# Patient Record
Sex: MALE | Race: WHITE | NOT HISPANIC OR LATINO | Employment: OTHER | URBAN - METROPOLITAN AREA
[De-identification: names, ages, dates, MRNs, and addresses within clinical notes are randomized per-mention and may not be internally consistent; named-entity substitution may affect disease eponyms.]

---

## 2017-07-21 ENCOUNTER — OFFICE VISIT (OUTPATIENT)
Dept: CARDIOLOGY | Facility: CLINIC | Age: 52
End: 2017-07-21
Payer: COMMERCIAL

## 2017-07-21 VITALS
HEART RATE: 70 BPM | SYSTOLIC BLOOD PRESSURE: 134 MMHG | DIASTOLIC BLOOD PRESSURE: 86 MMHG | BODY MASS INDEX: 29.09 KG/M2 | HEIGHT: 72 IN | WEIGHT: 214.75 LBS

## 2017-07-21 DIAGNOSIS — F41.9 ANXIETY: ICD-10-CM

## 2017-07-21 DIAGNOSIS — E78.00 HYPERCHOLESTEROLEMIA: Primary | ICD-10-CM

## 2017-07-21 DIAGNOSIS — Z82.49 FAMILY HISTORY OF PREMATURE CAD: ICD-10-CM

## 2017-07-21 DIAGNOSIS — R03.0 PRE-HYPERTENSION: ICD-10-CM

## 2017-07-21 DIAGNOSIS — I70.90 ATHEROSCLEROSIS OF ARTERIES: ICD-10-CM

## 2017-07-21 PROCEDURE — 99213 OFFICE O/P EST LOW 20 MIN: CPT | Mod: S$GLB,,, | Performed by: INTERNAL MEDICINE

## 2017-07-21 PROCEDURE — 99999 PR PBB SHADOW E&M-NEW PATIENT-LVL III: CPT | Mod: PBBFAC,,, | Performed by: INTERNAL MEDICINE

## 2017-07-21 RX ORDER — LORATADINE 10 MG/1
10 TABLET ORAL DAILY PRN
COMMUNITY
End: 2018-09-11 | Stop reason: SDUPTHER

## 2017-07-21 RX ORDER — PRAVASTATIN SODIUM 40 MG/1
40 TABLET ORAL DAILY
COMMUNITY
Start: 2017-06-25 | End: 2017-10-11 | Stop reason: SDUPTHER

## 2017-07-21 RX ORDER — ESCITALOPRAM OXALATE 5 MG/1
5 TABLET ORAL DAILY
Qty: 30 TABLET | Refills: 3 | Status: SHIPPED | OUTPATIENT
Start: 2017-07-21 | End: 2017-11-24 | Stop reason: SDUPTHER

## 2017-07-21 NOTE — PROGRESS NOTES
Subjective:    Patient ID:  Hakeem Avelar II is a 51 y.o. male who presents for Hyperlipidemia and Risk Factor Management For Atherosclerosis  ATHEROSCLEROSIS    HPI  PT WAS FOLLOWED AT [facility], NEW TO THIS CLINIC, STRESS WITH WORK, NO LABS, SEE ROS  Past Medical History:   Diagnosis Date    Aortic valve disorder     Cardiac murmur     Carotid artery occlusion     MINIMAL    Coronary artery disease     MINIMAL    Hyperlipidemia      Past Surgical History:   Procedure Laterality Date    TONSILLECTOMY       Family History   Problem Relation Age of Onset    Heart disease Mother     Heart disease Father      Social History     Social History    Marital status:      Spouse name: N/A    Number of children: N/A    Years of education: N/A     Social History Main Topics    Smoking status: Never Smoker    Smokeless tobacco: Never Used    Alcohol use No    Drug use: No    Sexual activity: Not Asked     Other Topics Concern    None     Social History Narrative    None       Review of patient's allergies indicates:  No Known Allergies    Current Outpatient Prescriptions:     aspirin-acetaminophen-caffeine 250-250-65 mg (EXCEDRIN MIGRAINE) 250-250-65 mg per tablet, Take 2 tablets by mouth once daily., Disp: , Rfl:     loratadine (CLARITIN) 10 mg tablet, Take 10 mg by mouth daily as needed for Allergies., Disp: , Rfl:     pravastatin (PRAVACHOL) 40 MG tablet, Take 40 mg by mouth once daily. , Disp: , Rfl:     escitalopram oxalate (LEXAPRO) 5 MG Tab, Take 1 tablet (5 mg total) by mouth once daily., Disp: 30 tablet, Rfl: 3    Review of Systems   Constitution: Negative for chills, diaphoresis, weakness, malaise/fatigue and night sweats.   HENT: Positive for congestion (ALLERGY). Negative for hearing loss and nosebleeds.         HAD ALLERGY TX 3 TIMES, MINIMAL IMPROVEMENT   Eyes: Negative for blurred vision, discharge, double vision and visual disturbance.   Cardiovascular: Negative for chest pain,  claudication, cyanosis, dyspnea on exertion (MINIMAL), irregular heartbeat, leg swelling, near-syncope, orthopnea, palpitations, paroxysmal nocturnal dyspnea and syncope.   Respiratory: Negative for cough, hemoptysis, shortness of breath, sleep disturbances due to breathing, sputum production and wheezing.         RECENT URI   Endocrine: Negative for cold intolerance, heat intolerance and polyuria.   Hematologic/Lymphatic: Negative for adenopathy and bleeding problem. Does not bruise/bleed easily.   Skin: Negative for color change, nail changes and rash.   Musculoskeletal: Negative for back pain (CHRONIC), falls, joint pain and stiffness.   Gastrointestinal: Negative for bloating, abdominal pain, change in bowel habit, dysphagia, heartburn, hematemesis, jaundice, melena and vomiting.   Genitourinary: Negative for dysuria, flank pain and frequency.   Neurological: Negative for brief paralysis, difficulty with concentration, disturbances in coordination, dizziness, focal weakness, light-headedness, loss of balance, numbness, paresthesias, seizures, sensory change and tremors.   Psychiatric/Behavioral: Negative for altered mental status, depression, memory loss and substance abuse. The patient has insomnia and is nervous/anxious (STRESS).    Allergic/Immunologic: Negative for environmental allergies and persistent infections.        Objective:      Vitals:    07/21/17 1038   BP: 134/86   Pulse: 70   Weight: 97.4 kg (214 lb 11.7 oz)   Height: 6' (1.829 m)   PainSc: 0-No pain     Body mass index is 29.12 kg/m².    Physical Exam   Constitutional: He is oriented to person, place, and time. He appears well-developed and well-nourished. He is active.   HENT:   Head: Normocephalic and atraumatic.   Mouth/Throat: Oropharynx is clear and moist and mucous membranes are normal.   Eyes: Conjunctivae and EOM are normal. Pupils are equal, round, and reactive to light.   Neck: Normal range of motion. Neck supple. Normal carotid  pulses, no hepatojugular reflux and no JVD present. Carotid bruit is not present. No tracheal deviation, no edema and no erythema present. No thyromegaly present.   Cardiovascular: Normal rate, regular rhythm and normal heart sounds.   No extrasystoles are present. PMI is not displaced.  Exam reveals no gallop, no distant heart sounds, no friction rub and no midsystolic click.    No murmur heard.  Pulses:       Carotid pulses are 2+ on the right side, and 2+ on the left side.       Radial pulses are 2+ on the right side, and 2+ on the left side.        Femoral pulses are 2+ on the right side, and 2+ on the left side.       Dorsalis pedis pulses are 2+ on the right side, and 2+ on the left side.        Posterior tibial pulses are 2+ on the right side, and 2+ on the left side.   Pulmonary/Chest: Effort normal and breath sounds normal. No accessory muscle usage. No tachypnea and no bradypnea. No respiratory distress.   Abdominal: Soft. Bowel sounds are normal. He exhibits no distension and no mass. There is no hepatosplenomegaly. There is no tenderness. There is no CVA tenderness.   Musculoskeletal: Normal range of motion. He exhibits no edema or deformity.   Lymphadenopathy:     He has no cervical adenopathy.   Neurological: He is alert and oriented to person, place, and time. He has normal strength. He displays no tremor. No cranial nerve deficit. He exhibits normal muscle tone. Coordination normal.   Skin: Skin is warm and dry. No cyanosis or erythema. No pallor.   Psychiatric: He has a normal mood and affect. His speech is normal and behavior is normal. Judgment and thought content normal.               ..    Chemistry    No results found for: NA, K, CL, CO2, BUN, CREATININE, GLU No results found for: CALCIUM, ALKPHOS, AST, ALT, BILITOT, ESTGFRAFRICA, EGFRNONAA         ..No results found for: CHOL  No results found for: HDL  No results found for: LDLCALC  No results found for: TRIG  No results found for:  CHOLHDL  ..No results found for: WBC, HGB, HCT, MCV, PLT    Test(s) Reviewed  I have reviewed the following in detail:  [] Stress test   [] Angiography   [] Echocardiogram   [] Labs   [x] Other:       Assessment:         ICD-10-CM ICD-9-CM   1. Hypercholesterolemia E78.00 272.0   2. Atherosclerosis of arteries I70.8 440.8   3. Pre-hypertension R03.0 796.2   4. Family history of premature CAD Z82.49 V17.3   5. Anxiety F41.9 300.00     Problem List Items Addressed This Visit        Neuro    Anxiety       Cardiac    Atherosclerosis of arteries    Relevant Orders    Comprehensive metabolic panel       Fluids/Electrolytes/Nutrition/GI    Hypercholesterolemia - Primary    Relevant Orders    Comprehensive metabolic panel    Lipid panel       Other    Pre-hypertension    Relevant Orders    Comprehensive metabolic panel    Family history of premature CAD      Other Visit Diagnoses    None.          Plan:     ALL CV CLINICALLY STABLE, NO ANGINA, NO HF, NO TIA, NO CLINICAL ARRHYTHMIA,CONTINUE CURRENT MEDS, EDUCATION, DIET, EXERCISE, OK FOR LOW DOSE LEXAPRO, RTC IN 3 MO WITH LABS, EXPLAINED PLAN TO PT/WIFE      Hypercholesterolemia  Comments:  CHECK  Orders:  -     Comprehensive metabolic panel; Future; Expected date: 07/21/2017  -     Lipid panel; Future; Expected date: 07/21/2017    Atherosclerosis of arteries  Comments:  STABLE  Orders:  -     Comprehensive metabolic panel; Future; Expected date: 07/21/2017    Pre-hypertension  Comments:  CATHY, CONSIDER RAMIPRIL  Orders:  -     Comprehensive metabolic panel; Future; Expected date: 07/21/2017    Family history of premature CAD    Anxiety  Comments:  OK FOR LEXAPRO    Other orders  -     escitalopram oxalate (LEXAPRO) 5 MG Tab; Take 1 tablet (5 mg total) by mouth once daily.  Dispense: 30 tablet; Refill: 3    RTC Low level/low impact aerobic exercise 5x's/wk. Heart healthy diet and risk factor modification.    See labs and med orders.    Aerobic exercise 5x's/wk. Heart  healthy diet and risk factor modification.    See labs and med orders.

## 2017-07-21 NOTE — PATIENT INSTRUCTIONS
Anxiety Reaction  Anxiety is the feeling we all get when we think something bad might happen. It is a normal response to stress and usually causes only a mild reaction. When anxiety becomes more severe, it can interfere with daily life. In some cases, you may not even be aware of what it is youre anxious about. There may also be a genetic link or it may be a learned behavior in the home.  Both psychological and physical triggers cause stress reaction. It's often a response to fear or emotional stress, real or imagined. This stress may come from home, family, work, or social relationships.  During an anxiety reaction, you may feel:  · Helpless  · Nervous  · Depressed  · Irritable  Your body may show signs of anxiety in many ways. You may experience:  · Dry mouth  · Shakiness  · Dizziness  · Weakness  · Trouble breathing  · Breathing fast (hyperventilating)  · Chest pressure  · Sweating  · Headache  · Nausea  · Diarrhea  · Tiredness  · Inability to sleep  · Sexual problems  Home care  · Try to locate the sources of stress in your life. They may not be obvious. These may include:  ¨ Daily hassles of life (traffic jams, missed appointments, car troubles, etc.)  ¨ Major life changes, both good (new baby, job promotion) and bad (loss of job, loss of loved one)  ¨ Overload: feeling that you have too many responsibilities and can't take care of all of them at once  ¨ Feeling helpless, feeling that your problems are beyond what youre able to solve  · Notice how your body reacts to stress. Learn to listen to your body signals. This will help you take action before the stress becomes severe.  · When you can, do something about the source of your stress. (Avoid hassles, limit the amount of change that happens in your life at one time and take a break when you feel overloaded).  · Unfortunately, many stressful situations can't be avoided. It is necessary to learn how to better manage stress. There are many proven methods  that will reduce your anxiety. These include simple things like exercise, good nutrition and adequate rest. Also, there are certain techniques that are helpful:  ¨ Relaxation  ¨ Breathing exercises  ¨ Visualization  ¨ Biofeedback  ¨ Meditation  For more information about this, consult your doctor or go to a local bookstore and review the many books and tapes available on this subject.  Follow-up care  If you feel that your anxiety is not responding to self-help measures, contact your doctor or make an appointment with a counselor. You may need short-term psychological counseling and temporary medicine to help you manage stress.  Call 911  Call your healthcare provider right away if any of these occur:  · Trouble breathing  · Confusion  · Drowsiness or trouble wakening  · Fainting or loss of consciousness  · Rapid heart rate  · Seizure  · New chest pain that becomes more severe, lasts longer, or spreads into your shoulder, arm, neck, jaw, or back  When to seek medical advice  Call your healthcare provider right away if any of these occur:  · Your symptoms get worse  · Severe headache not relieved by rest and mild pain reliever  Date Last Reviewed: 9/29/2015  © 5962-5434 [employer]. [address] [place] [address]. All rights reserved. This information is not intended as a substitute for professional medical care. Always follow your healthcare professional's instructions.        Taking Aspirin for Atherosclerosis       Aspirin is a medicine often prescribed to treat atherosclerosis. This condition affects your arteries. These are the blood vessels that carry blood away from your heart. Having atherosclerosis means youre at greater risk of a heart attack or stroke. Aspirin can help prevent these from happening.  How atherosclerosis affects your arteries   A fatty material (plaque) can build up in your arteries. This makes it harder for blood to flow through them. A blood clot can then form on  the plaque. This may block the artery, cutting off blood flow. This can cause conditions such as coronary artery disease (CAD) and peripheral arterial disease (PAD):  · CAD occurs when plaque builds up in the coronary artery. This artery supplies the heart with oxygen-rich blood.  · PAD occurs when plaque forms in leg arteries.  The same things that cause CAD and PAD can also cause plaque to form in other arteries in your body, such as those in the brain. When plaque occurs in any of these arteries, it raises your risk of heart attack or stroke.  What aspirin does  Aspirin is a blood-thinner (antiplatelet medicine). It helps keep blood clots from forming. This reduces the risk of blockage. Aspirin can be taken daily if you are at high risk of or have already had a heart attack or stroke. It is also used after a procedure called a stent placement. This is when a tiny wire mesh tube, or stent, is placed in an artery to keep it open. Aspirin helps prevent blood clots from forming on the stent.  Taking aspirin safely  Tell your healthcare provider about any medicines you are taking. This includes:  · All prescription medicines  · Over-the-counter medicines  · Herbs, vitamins, and other supplements  Also mention if you have a history of ulcers or bleeding problems. Ask whether you will need to stop taking aspirin before having surgery or dental work. Always take medicines as directed.  Tips for taking aspirin  · Have a routine. For example, take aspirin with the same meal each day.  · Dont take more than prescribed. A low dose gives the same benefit as a higher one, with a lower risk of side effects.  · Dont skip doses. Aspirin needs to be taken each day to work well.  · Keep track of what you take. A pillbox with days of the week can help, especially if you take several medicines. Or use a list or chart to keep track.  When to call your healthcare provider  Side effects of aspirin are not usually serious. If you do  have problems, changing your dose may help. Call your healthcare provider if you have any of the following:  · Excessive bruising (some bruising is normal)  · Nosebleeds, bleeding gums, or other excessive bleeding  · An upset stomach or stomach pain   Date Last Reviewed: 6/1/2016  © 1953-2752 [employer]. [address] [place] [address], [place] [address]. All rights reserved. This information is not intended as a substitute for professional medical care. Always follow your healthcare professional's instructions.        Heart Disease Education    The heart beats 60 to 100 times per minute, 24 hours a day. This equals almost 1000,000 times a day. It pumps blood with oxygen and nutrients to the tissues and organs of the body. But the heart is a muscle and needs its own supply of blood. Blood flow to the heart is supplied by the coronary arteries. Coronary artery disease (atherosclerosis) is a result of cholesterol, saturated fat, and calcium deposits (plaques) that build up inside the walls. This causes inflammation within the coronary arteries. These plaques narrow the artery and reduce blood flow to the heart muscle. The reduction in blood flow to the heart muscle decreases oxygen supply to the heart. If the narrowing is significant enough, the oxygen supply to one or more regions of the heart can be temporarily or permanently shut down. This can cause chest pain, and possibly death of heart tissue (heart attack).  Types of chest pain  Angina is the name for pain in the heart muscle. Angina is a warning sign of serious heart disease. When untreated it can lead to a heart attack, also known as acute myocardial infarction, or AMI. Angina occurs when there is not enough blood and oxygen flowing to the heart for the amount of work it is doing. This most often happens during physical exertion, when the heart is working hardest. It is usually relieved by rest or nitroglycerin. Angina may also occur after a large meal  when extra blood is sent to the digestive organs and less goes to the heart. In the case of advanced or unstable heart disease, angina can occur at rest or awaken you from sleep. Angina usually lasts from a few minutes up to 20 minutes or more. When treated early, the effects of angina can be reversed without permanent damage to the heart. Angina is a serious condition and needs to be evaluated by a medical professional immediately.  There are two types of angina -- stable and unstable:  · Stable angina usually occurs with a predictable level of activity. Being stable, its character, severity, and occurrence do not change much over time. It usually starts with activity, and resolves with rest or taking your medicine as instructed by your doctor. The symptoms usually do not last long.  · Unstable angina changes or gets worse over time. It is different from whatever you are used to. It may feel different or worse, begin without cause, occur with exercise or exertion, wake you up from sleep, and last longer. It may not respond in the same way as it does when you take your usual medicines for an attack. This type of angina can be a warning sign of an impending heart attack.     A heart attack is usually the result of a blood clot that suddenly forms in a coronary artery that has been narrowed with plaque. When this occurs, blood flow may be cut off to a part of the heart muscle, causing the cells to die. This weakens the pumping action of the heart, which affects the delivery of blood to all the other organs in the body including the brain. This damage is not reversible. However, early treatment can limit the amount of damage.  The pain you feel with angina and a heart attack may have a similar quality. However, it is usually different in intensity and duration. Here are some typical descriptions of a heart attack:  · It is most often experienced as a squeezing, crushing, pressure-like sensation in the center of the  "chest.  · It is sometimes described as something heavy sitting on my chest.  · It may feel more like a bad case of indigestion.  · The pain may spread from the chest to the arm, shoulder, throat or jaw.  · Sometimes the pain is not felt in the chest at all, but only in the arm, shoulder, throat or jaw.  · There may also be nausea, vomiting, dizziness or light-headedness, sweating and trouble breathing.  · Palpitations, or your heart beating rapidly  · A new, irregular heart beat  · Unexplained weakness  You may not be able to tell the difference between "bad" angina and a heart attack at home. Seek help if your symptoms are different than usual. Do not be in denial or just try to "tough it out."  Call 911  This is the fastest and safest way to get to the emergency department. The paramedics can also start treatment on the way to the hospital, saving valuable time for your heart.  · If the angina gets worse, if it continues, or if it stops and returns, call 911 immediately. Do not delay. You may be having a heart attack.  · After you call 911, take a second tablet or spray unless instructed otherwise. When repeating doses, sit down if possible, because it can make you feel lightheaded or dizzy. Wait another 5 minutes. If the angina still does not go away, take a third tablet or spray. Do not take more than 3 tablets or sprays within 15 minutes. Stay on the phone with 911 for further instruction.  · Your healthcare provider may give you slightly different instructions than those above. If so, follow them carefully.  Do not wait until symptoms become severe to call 911.  Other reasons to call 911 include:  · Trouble breathing  · Feeling lightheaded, faint, or dizzy  · Rapid heart beat  · Slower than usual heart rate compared to your normal  · Angina with weakness, dizziness, fainting, heavy sweating, nausea, or vomiting  · Extreme drowsiness, confusion  · Weakness of an arm or leg or one side of the " "face  · Difficulty with speech or vision  When to seek medical care  Remember, the signs and symptoms of a heart attack are not always like they are on TV. Sometimes they are not so obvious. You may only feel weak, or just not right. If it is not clear or if you have any doubt, call for advice.  · Seek help if there is a change in the type of pain, if it feels different, or if your symptoms are mild.  · Do not drive yourself. Have someone else drive you. If no one can drive, call 911.  · Do not delay. Fast diagnosis and treatment can prevent or limit the amount of heart damage during a heart attack.  · Do not go to your doctor's office or a clinic as they may not be able to provide all the testing and treatment required for this condition.  · If your doctor has given you medicine to take when symptoms occur, take them but don't delay getting help trying to locate medicines.  What happens in the emergency department  The emergency department is connected to your local emergency medical system (EMS) through 911. That's why during a cardiac emergency, calling 911 is the fastest way to get help. The goal of the emergency department is to rapidly screen, evaluate, and treat people.  Once you are there, an electrocardiogram (ECG or heart tracing) will be done. Blood samples may be taken to look for the presence of heart enzymes that leak from damaged heart cells and show if a heart attack is occurring. You will often be evaluated by a heart specialist (cardiologist) who decides the best course of action. In the case of severe angina or early heart attack, and depending on the circumstances, powerful "clot busting" medicines can be used to dissolve blood clots in the coronary artery. In other cases, you may be taken to a cardiac catheterization lab. Here, a tiny balloon-tipped catheter is advanced through blood vessels to the heart. There the balloon is inflated pushing open the blood vessel restoring blood flow.  Risk " factors for heart disease  Risk factors for heart disease are a combination of genetic and lifestyle. Many risk factors work by either directly or indirectly damaging the blood vessels of the heart, or by increasing the risk of forming blood or cholesterol clots, which then clog up and block the arteries.     Examples of physical lifestyle risk factors:  · Cigarette smoking  · High blood pressure  · High blood cholesterol  · Use of stimulant drugs such as cocaine, crack, and amphetamines  · Eating a high-fat, high-cholesterol meal  · Diabetes   · Obesity which increases risk for diabetes and high blood pressure  · Lack of regular physical activity     Examples of emotional lifestyle factors:  · Chronic high stress levels release stress hormones. These raise blood pressure and cholesterol level and makes blood clot more easily.  · Held-in anger, hostile or cynical attitude  · Social and emotional isolation, lack of intimacy  · Loss of relationship  · Depression  Other factors that increase the risk of heart attack that you cannot control :  · Age. The older you get beyond 40, the greater is your risk of significant coronary artery disease.  · Gender. More men than women get heart disease; but once past menopause, women who are not taking estrogen replacement have the same risk as men for a heart attack.  · Family history. If your mother, father, brother or sister has coronary artery disease, your risk of having it is higher than a person your age without this family history.  What can you do to decrease your risk  To reduce your risk of heart disease:  · Get regular checkups with your doctor.  · Take your medicines for blood pressure, cholesterol or diabetes as directed.  · Watch your diet. Eat a heart healthy diet choosing fresh foods, less salt, cholesterol, and fat  · Stop smoking. Get help if needed.  · Get regular exercise.  · Manage stress.  · Carry a list of medicines and doses in your wallet.  Date Last  Reviewed: 12/30/2015  © 8168-2648 [employer]. [address] [place] [address], [place] [address]. All rights reserved. This information is not intended as a substitute for professional medical care. Always follow your healthcare professional's instructions.        Established High Blood Pressure    High blood pressure (hypertension) is a chronic disease. Often health care providers dont know what causes it. But it can be caused by certain health conditions and medicines.  If you have high blood pressure, you may not have any symptoms. If you do have symptoms, they may include headache, dizziness, changes in your vision, chest pain, and shortness of breath. But even without symptoms, high blood pressure thats not treated raises your risk for heart attack and stroke. High blood pressure is a serious health risk and shouldnt be ignored.  A blood pressure reading is made up of two numbers: a higher number over a lower number. The top number is the systolic pressure. The bottom number is the diastolic pressure. A normal blood pressure is less than 120 over less than 80.  High blood pressure is when either the top number is 140 or higher, or the bottom number is 90 or higher. This must be the result when taking your blood pressure a number of times. The blood pressures between normal and high are called prehypertension.  Home care  If you have high blood pressure, you should do what is listed below to lower your blood pressure. If you are taking medicines for high blood pressure, these methods may reduce or end your need for medicines in the future.  · Begin a weight-loss program if you are overweight.  · Cut back on how much salt you get in your diet. Heres how to do this:  ¨ Dont eat foods that have a lot of salt. These include olives, pickles, smoked meats, and salted potato chips.  ¨ Dont add salt to your food at the table.  ¨ Use only small amounts of salt when cooking.  · Begin an exercise program. Talk  with your health care provider about the type of exercise program that would be best for you. It doesn't have to be hard. Even brisk walking for 20 minutes 3 times a week is a good form of exercise.  · Dont take medicines that have heart stimulants. This includes many cold and sinus decongestant pills and sprays, as well as diet pills. Check the warnings about hypertension on the label. Stimulants such as amphetamine or cocaine could be lethal for someone with high blood pressure. Never take these.  · Limit how much caffeine you get in your diet. Switch to caffeine-free products.  · Stop smoking. If you are a long-time smoker, this can be hard. Enroll in a stop-smoking program to make it more likely that you will quit for good.  · Learn how to handle stress. This is an important part of any program to lower blood pressure. Learn about relaxation methods like meditation, yoga, or biofeedback.  · If your provider prescribed medicines, take them exactly as directed. Missing doses may cause your blood pressure get out of control.  · Consider buying an automatic blood pressure machine. You can get one of these at most pharmacies. Use this to watch your blood pressure at home. Give the results to your provider.  Follow-up care  You will need to make regular visits to your health care provider. This is to check your blood pressure and to make changes to your medicines. Make a follow-up appointment as directed.  When to seek medical advice  Call your health care provider right away if any of these occur:  · Chest pain or shortness of breath  · Severe headache  · Throbbing or rushing sound in the ears  · Nosebleed  · Sudden severe pain in your belly (abdomen)  · Extreme drowsiness, confusion, or fainting  · Dizziness or dizziness with a spinning sensation (vertigo)  · Weakness of an arm or leg or one side of the face  · You have problems speaking or seeing   Date Last Reviewed: 11/25/2014  © 2385-8159 [employer] [address] [place] [address]. All rights reserved. This information is not intended as a substitute for professional medical care. Always follow your healthcare professional's instructions.

## 2017-10-11 RX ORDER — PRAVASTATIN SODIUM 40 MG/1
TABLET ORAL
Qty: 30 TABLET | Refills: 5 | Status: SHIPPED | OUTPATIENT
Start: 2017-10-11 | End: 2018-05-13 | Stop reason: SDUPTHER

## 2017-10-20 ENCOUNTER — OFFICE VISIT (OUTPATIENT)
Dept: CARDIOLOGY | Facility: CLINIC | Age: 52
End: 2017-10-20
Payer: COMMERCIAL

## 2017-10-20 VITALS
WEIGHT: 218.69 LBS | SYSTOLIC BLOOD PRESSURE: 125 MMHG | DIASTOLIC BLOOD PRESSURE: 82 MMHG | BODY MASS INDEX: 29.62 KG/M2 | HEART RATE: 54 BPM | HEIGHT: 72 IN

## 2017-10-20 DIAGNOSIS — R00.1 BRADYCARDIA: ICD-10-CM

## 2017-10-20 DIAGNOSIS — R03.0 PRE-HYPERTENSION: ICD-10-CM

## 2017-10-20 DIAGNOSIS — E78.00 HYPERCHOLESTEROLEMIA: Primary | ICD-10-CM

## 2017-10-20 PROCEDURE — 99213 OFFICE O/P EST LOW 20 MIN: CPT | Mod: S$GLB,,, | Performed by: INTERNAL MEDICINE

## 2017-10-20 PROCEDURE — 99999 PR PBB SHADOW E&M-EST. PATIENT-LVL III: CPT | Mod: PBBFAC,,, | Performed by: INTERNAL MEDICINE

## 2017-10-20 NOTE — PATIENT INSTRUCTIONS
Bradycardia    When your heart rate is slow, less than 60 beats per minute, it is called bradycardia. Bradycardia can be normal, caused by medicines, or a sign of a disease. The slow heart rate may not be constant; it can come and go. It is a concern when it is very low, or you have symptoms.  Signs and symptoms  The following are signs and symptoms of bradycardia:  · Heart rate less than 60 per minute  · Dizziness or feeling lightheaded  · Weakness  · Trouble breathing  · Fainting  · Sleepiness  · More trouble exercising than usual because of fatigue  · Confusion or trouble concentrating  Causes  There are many causes of bradycardia. Some can be related to your heart, but some may be related to other factors.  Non-heart-related causes:  · Advanced age  · Side effect of certain medicines (such as beta-blockers, calcium channel blockers, digitalis, antiarrhythmic medicines like amiodarone, clonidine, lithium)  · Medical conditions such as hypoglycemia (low blood sugar), hypothyroidism (low thyroid), electrolyte disorder,  hypothermia, sleep apnea  · Athletes, especially long-distance runners, may have a slow heart rate. This can be normal.  · Sleep apnea  · Brain injury such as stroke or bleeding inside the brain  Heart-related causes:  · Coronary artery disease (angina or prior heart attack, also known as acute myocardial infarction, or AMI)  · Heart valve disease  · Heart muscle disease (cardiomyopathy)  · Congestive heart failure  · Sick sinus syndrome, which is when your heart's natural pacemaker is no longer working properly  · Diseases that infiltrate the heart such as sarcoid  · Heart infections  Sometimes the cause for the arrhythmia cannot be found.  Bradycardia that causes symptoms is sometimes reversible, and can be treated with medicines. When more severe bradycardia persists, a pacemaker is generally recommended. When the bradycardia does not cause symptoms, your doctor may decide to evaluate it in his  or her office.  Home care  The following will help you care for yourself at home:  · Resume your usual activities when you are feeling back to normal.  · If you develop any of the symptoms below during exertion, then you should not exert yourself until evaluated further by your doctor.  · Work with your doctor on any needed lifestyle changes, such as changing your diet, stopping smoking if you are a smoker, and a planned exercise program.  Follow-up care  Follow up with your doctor, or as advised.  Call 911  Call 911 if any of the following occur:  · Chest pain  · Trouble breathing  · Slow heart rate with dizziness or lightheadedness  · Fainting or loss of consciousness  · Chest, shoulder, arm, neck, or back pain  · Slow heart rate (under 50 beats per minute) if associated with symptoms  When to seek medical advice  Get prompt medical attention if any of the following occur:  · Occasional weakness, dizziness, or lightheadedness  Date Last Reviewed: 4/25/2016  © 9859-4492 [employer]. [address] [place] [address]. All rights reserved. This information is not intended as a substitute for professional medical care. Always follow your healthcare professional's instructions.        High Cholesterol: Assessing Your Risk    Have you been told that your cholesterol is too high? If so, you could be heading for a heart attack, also known as acute myocardial infarction (AMI), or stroke. This is especially true if you have other risk factors for heart disease. Get smart about cholesterol and your heart disease risk. This sheet can help you understand your heart disease risk and how your cholesterol level affects it. Talk to your healthcare provider about how to get started controlling your cholesterol.  Why is high cholesterol a problem?  Blood cholesterol is a fatty substance. The body uses it to make membranes in cells and for hormone production. It travels through the bloodstream and is used by the  tissues for normal function. When blood cholesterol is high, it forms plaque and causes inflammation. The plaque builds up in the walls of arteries (blood vessels that carry blood from the heart to the body). This narrows the opening for blood flow. Over time, the heart may not get enough oxygen. This can lead to coronary artery disease, heart attack, or stroke.  3 steps to assessing your risk  Step 1. Find your risk factors for heart disease and stroke  How your cholesterol numbers affect your heart health depends on other risk factors for heart attack and stroke. Check off each risk factor below that applies to you:  · Are you a man 45 years old or older or a woman 55 years old or older?  · Does your family have a history of heart problems before the age of 55 in male relatives or age 65 in female relatives? This includes heart attack, coronary heart disease, or atherosclerosis.  · Do you have high blood pressure? Do you take medicine to treat high blood pressure?  · Do you smoke?  · Do you have diabetes?  · Do you exercise very little or not very often? Recommendations are for 30 minutes of exercise at least 5 days a week. If you are not doing cardiovascular exercise as often as these recommendations, it may not be enough and you may be at higher risk for elevated cholesterol and heart disease.  · Do you eat a diet that is high in saturated or trans fats, cholesterol, sugar, or alcohol? You may be at increased risk for heart disease if you do not eat enough fruits, vegetables, lean meats and eat sugars or drink alcohol sparingly.  · Have you been told you have high cholesterol? Do you take medicine to control your cholesterol?  Step 2. Test your cholesterol  Have your cholesterol tested every 5 years after the age of 20 and more often if you have risk factors. Cholesterol testing most often needs no preparation. Sometimes you may be asked to fast (not eat) before your test. A blood sample is taken and sent to a  lab. There, the amount of cholesterol and triglyceride in your blood is measured. There are 2 types of cholesterol in the sample. The first is HDL (good cholesterol). The second is LDL (bad cholesterol). Cholesterol test results are most often shown as the total of HDL and LDL cholesterol numbers. You may also be told the separate HDL and LDL cholesterol results.  Fill in your numbers below.  HDL cholesterol:                           LDL cholesterol:                         Total cholesterol:                         Triglyceride:                           Step 3. Discuss the results with your healthcare provider   If your cholesterol levels are higher than normal, your healthcare provider will help you with steps to take to lower your levels. Steps may include lifestyle changes like diet, physical activity, and quitting smoking, and medicine to lower bad cholesterol levels.  If you have high cholesterol, you may need your cholesterol level tested more often to make sure your medicine and lifestyle changes are working to reduce your risks of having a heart attack or stroke.   Date Last Reviewed: 6/1/2016  © 3784-2211 [employer]. [address] [place] [address]. All rights reserved. This information is not intended as a substitute for professional medical care. Always follow your healthcare professional's instructions.        Heart Disease Education    The heart beats 60 to 100 times per minute, 24 hours a day. This equals almost 1000,000 times a day. It pumps blood with oxygen and nutrients to the tissues and organs of the body. But the heart is a muscle and needs its own supply of blood. Blood flow to the heart is supplied by the coronary arteries. Coronary artery disease (atherosclerosis) is a result of cholesterol, saturated fat, and calcium deposits (plaques) that build up inside the walls. This causes inflammation within the coronary arteries. These plaques narrow the artery and  reduce blood flow to the heart muscle. The reduction in blood flow to the heart muscle decreases oxygen supply to the heart. If the narrowing is significant enough, the oxygen supply to one or more regions of the heart can be temporarily or permanently shut down. This can cause chest pain, and possibly death of heart tissue (heart attack).  Types of chest pain  Angina is the name for pain in the heart muscle. Angina is a warning sign of serious heart disease. When untreated it can lead to a heart attack, also known as acute myocardial infarction, or AMI. Angina occurs when there is not enough blood and oxygen flowing to the heart for the amount of work it is doing. This most often happens during physical exertion, when the heart is working hardest. It is usually relieved by rest or nitroglycerin. Angina may also occur after a large meal when extra blood is sent to the digestive organs and less goes to the heart. In the case of advanced or unstable heart disease, angina can occur at rest or awaken you from sleep. Angina usually lasts from a few minutes up to 20 minutes or more. When treated early, the effects of angina can be reversed without permanent damage to the heart. Angina is a serious condition and needs to be evaluated by a medical professional immediately.  There are two types of angina -- stable and unstable:  · Stable angina usually occurs with a predictable level of activity. Being stable, its character, severity, and occurrence do not change much over time. It usually starts with activity, and resolves with rest or taking your medicine as instructed by your doctor. The symptoms usually do not last long.  · Unstable angina changes or gets worse over time. It is different from whatever you are used to. It may feel different or worse, begin without cause, occur with exercise or exertion, wake you up from sleep, and last longer. It may not respond in the same way as it does when you take your usual  "medicines for an attack. This type of angina can be a warning sign of an impending heart attack.     A heart attack is usually the result of a blood clot that suddenly forms in a coronary artery that has been narrowed with plaque. When this occurs, blood flow may be cut off to a part of the heart muscle, causing the cells to die. This weakens the pumping action of the heart, which affects the delivery of blood to all the other organs in the body including the brain. This damage is not reversible. However, early treatment can limit the amount of damage.  The pain you feel with angina and a heart attack may have a similar quality. However, it is usually different in intensity and duration. Here are some typical descriptions of a heart attack:  · It is most often experienced as a squeezing, crushing, pressure-like sensation in the center of the chest.  · It is sometimes described as something heavy sitting on my chest.  · It may feel more like a bad case of indigestion.  · The pain may spread from the chest to the arm, shoulder, throat or jaw.  · Sometimes the pain is not felt in the chest at all, but only in the arm, shoulder, throat or jaw.  · There may also be nausea, vomiting, dizziness or light-headedness, sweating and trouble breathing.  · Palpitations, or your heart beating rapidly  · A new, irregular heart beat  · Unexplained weakness  You may not be able to tell the difference between "bad" angina and a heart attack at home. Seek help if your symptoms are different than usual. Do not be in denial or just try to "tough it out."  Call 911  This is the fastest and safest way to get to the emergency department. The paramedics can also start treatment on the way to the hospital, saving valuable time for your heart.  · If the angina gets worse, if it continues, or if it stops and returns, call 911 immediately. Do not delay. You may be having a heart attack.  · After you call 911, take a second tablet or spray " unless instructed otherwise. When repeating doses, sit down if possible, because it can make you feel lightheaded or dizzy. Wait another 5 minutes. If the angina still does not go away, take a third tablet or spray. Do not take more than 3 tablets or sprays within 15 minutes. Stay on the phone with 911 for further instruction.  · Your healthcare provider may give you slightly different instructions than those above. If so, follow them carefully.  Do not wait until symptoms become severe to call 911.  Other reasons to call 911 include:  · Trouble breathing  · Feeling lightheaded, faint, or dizzy  · Rapid heart beat  · Slower than usual heart rate compared to your normal  · Angina with weakness, dizziness, fainting, heavy sweating, nausea, or vomiting  · Extreme drowsiness, confusion  · Weakness of an arm or leg or one side of the face  · Difficulty with speech or vision  When to seek medical care  Remember, the signs and symptoms of a heart attack are not always like they are on TV. Sometimes they are not so obvious. You may only feel weak, or just not right. If it is not clear or if you have any doubt, call for advice.  · Seek help if there is a change in the type of pain, if it feels different, or if your symptoms are mild.  · Do not drive yourself. Have someone else drive you. If no one can drive, call 911.  · Do not delay. Fast diagnosis and treatment can prevent or limit the amount of heart damage during a heart attack.  · Do not go to your doctor's office or a clinic as they may not be able to provide all the testing and treatment required for this condition.  · If your doctor has given you medicine to take when symptoms occur, take them but don't delay getting help trying to locate medicines.  What happens in the emergency department  The emergency department is connected to your local emergency medical system (EMS) through 911. That's why during a cardiac emergency, calling 911 is the fastest way to get help.  "The goal of the emergency department is to rapidly screen, evaluate, and treat people.  Once you are there, an electrocardiogram (ECG or heart tracing) will be done. Blood samples may be taken to look for the presence of heart enzymes that leak from damaged heart cells and show if a heart attack is occurring. You will often be evaluated by a heart specialist (cardiologist) who decides the best course of action. In the case of severe angina or early heart attack, and depending on the circumstances, powerful "clot busting" medicines can be used to dissolve blood clots in the coronary artery. In other cases, you may be taken to a cardiac catheterization lab. Here, a tiny balloon-tipped catheter is advanced through blood vessels to the heart. There the balloon is inflated pushing open the blood vessel restoring blood flow.  Risk factors for heart disease  Risk factors for heart disease are a combination of genetic and lifestyle. Many risk factors work by either directly or indirectly damaging the blood vessels of the heart, or by increasing the risk of forming blood or cholesterol clots, which then clog up and block the arteries.     Examples of physical lifestyle risk factors:  · Cigarette smoking  · High blood pressure  · High blood cholesterol  · Use of stimulant drugs such as cocaine, crack, and amphetamines  · Eating a high-fat, high-cholesterol meal  · Diabetes   · Obesity which increases risk for diabetes and high blood pressure  · Lack of regular physical activity     Examples of emotional lifestyle factors:  · Chronic high stress levels release stress hormones. These raise blood pressure and cholesterol level and makes blood clot more easily.  · Held-in anger, hostile or cynical attitude  · Social and emotional isolation, lack of intimacy  · Loss of relationship  · Depression  Other factors that increase the risk of heart attack that you cannot control :  · Age. The older you get beyond 40, the greater is " your risk of significant coronary artery disease.  · Gender. More men than women get heart disease; but once past menopause, women who are not taking estrogen replacement have the same risk as men for a heart attack.  · Family history. If your mother, father, brother or sister has coronary artery disease, your risk of having it is higher than a person your age without this family history.  What can you do to decrease your risk  To reduce your risk of heart disease:  · Get regular checkups with your doctor.  · Take your medicines for blood pressure, cholesterol or diabetes as directed.  · Watch your diet. Eat a heart healthy diet choosing fresh foods, less salt, cholesterol, and fat  · Stop smoking. Get help if needed.  · Get regular exercise.  · Manage stress.  · Carry a list of medicines and doses in your wallet.  Date Last Reviewed: 12/30/2015  © 5809-8818 [employer]. [address] [place] [address], [place] [address]. All rights reserved. This information is not intended as a substitute for professional medical care. Always follow your healthcare professional's instructions.

## 2017-10-20 NOTE — PROGRESS NOTES
Subjective:    Patient ID:  Hakeem Avelar II is a 51 y.o. male who presents for Hyperlipidemia      HPI  HAD LABS AT [facility], NOT RECEIVED, [facility] DENIES ,DOING OK MEXCEPT FOR BACK, SEE ROS  Past Medical History:   Diagnosis Date    Aortic valve disorder     Cardiac murmur     Carotid artery occlusion     MINIMAL    Coronary artery disease     MINIMAL    Hyperlipidemia      Past Surgical History:   Procedure Laterality Date    TONSILLECTOMY       Family History   Problem Relation Age of Onset    Heart disease Mother     Heart disease Father      Social History     Social History    Marital status:      Spouse name: N/A    Number of children: N/A    Years of education: N/A     Social History Main Topics    Smoking status: Never Smoker    Smokeless tobacco: Never Used    Alcohol use No    Drug use: No    Sexual activity: Not Asked     Other Topics Concern    None     Social History Narrative    None       Review of patient's allergies indicates:  No Known Allergies    Current Outpatient Prescriptions:     aspirin-acetaminophen-caffeine 250-250-65 mg (EXCEDRIN MIGRAINE) 250-250-65 mg per tablet, Take 2 tablets by mouth once daily., Disp: , Rfl:     escitalopram oxalate (LEXAPRO) 5 MG Tab, Take 1 tablet (5 mg total) by mouth once daily., Disp: 30 tablet, Rfl: 3    loratadine (CLARITIN) 10 mg tablet, Take 10 mg by mouth daily as needed for Allergies., Disp: , Rfl:     pravastatin (PRAVACHOL) 40 MG tablet, 1 TABLET ONCE A DAY ORALLY 30 DAYS, Disp: 30 tablet, Rfl: 5    Review of Systems   Constitution: Negative for chills, diaphoresis, weakness, malaise/fatigue and night sweats.   HENT: Negative for hearing loss and nosebleeds. Congestion: ALLERGY.    Eyes: Negative for blurred vision, discharge, double vision and visual disturbance.   Cardiovascular: Negative for chest pain, claudication, cyanosis, dyspnea on exertion (MINIMAL), irregular heartbeat, leg swelling, near-syncope, orthopnea,  palpitations, paroxysmal nocturnal dyspnea and syncope.   Respiratory: Negative for cough, hemoptysis, shortness of breath, sleep disturbances due to breathing, sputum production and wheezing.         RECENT URI   Endocrine: Negative for cold intolerance, heat intolerance and polyuria.   Hematologic/Lymphatic: Negative for adenopathy and bleeding problem. Does not bruise/bleed easily.   Skin: Negative for color change, nail changes and rash.   Musculoskeletal: Negative for back pain (CHRONIC), falls, joint pain and stiffness.   Gastrointestinal: Negative for abdominal pain, change in bowel habit, dysphagia, heartburn, hematemesis, jaundice, melena and vomiting.   Genitourinary: Negative for dysuria, flank pain and frequency.   Neurological: Negative for brief paralysis, difficulty with concentration, disturbances in coordination, dizziness, focal weakness, light-headedness, loss of balance, numbness, paresthesias, seizures, sensory change and tremors.   Psychiatric/Behavioral: Negative for altered mental status, depression, memory loss and substance abuse. The patient does not have insomnia and is not nervous/anxious (STRESS).    Allergic/Immunologic: Negative for environmental allergies and persistent infections.        Objective:      Vitals:    10/20/17 0811   BP: 125/82   Pulse: (!) 54   Weight: 99.2 kg (218 lb 11.1 oz)   Height: 6' (1.829 m)   PainSc:   4     Body mass index is 29.66 kg/m².    Physical Exam   Constitutional: He is oriented to person, place, and time. He appears well-developed and well-nourished. He is active.   HENT:   Head: Normocephalic and atraumatic.   Mouth/Throat: Oropharynx is clear and moist and mucous membranes are normal.   Eyes: Conjunctivae and EOM are normal. Pupils are equal, round, and reactive to light.   Neck: Normal range of motion. Neck supple. Normal carotid pulses, no hepatojugular reflux and no JVD present. Carotid bruit is not present. No tracheal deviation, no edema and  no erythema present. No thyromegaly present.   Cardiovascular: Normal rate, regular rhythm and normal heart sounds.   No extrasystoles are present. PMI is not displaced.  Exam reveals no gallop, no distant heart sounds, no friction rub and no midsystolic click.    No murmur heard.  Pulses:       Carotid pulses are 2+ on the right side, and 2+ on the left side.       Radial pulses are 2+ on the right side, and 2+ on the left side.        Femoral pulses are 2+ on the right side, and 2+ on the left side.       Dorsalis pedis pulses are 2+ on the right side, and 2+ on the left side.        Posterior tibial pulses are 2+ on the right side, and 2+ on the left side.   Pulmonary/Chest: Effort normal and breath sounds normal. No accessory muscle usage. No tachypnea and no bradypnea. No respiratory distress.   Abdominal: Soft. Bowel sounds are normal. He exhibits no distension and no mass. There is no hepatosplenomegaly. There is no tenderness. There is no CVA tenderness.   Musculoskeletal: Normal range of motion. He exhibits no edema or deformity.   Lymphadenopathy:     He has no cervical adenopathy.   Neurological: He is alert and oriented to person, place, and time. He has normal strength. He displays no tremor. No cranial nerve deficit. He exhibits normal muscle tone. Coordination normal.   Skin: Skin is warm and dry. No cyanosis or erythema. No pallor.   Psychiatric: He has a normal mood and affect. His speech is normal and behavior is normal. Judgment and thought content normal.               ..    Chemistry    No results found for: NA, K, CL, CO2, BUN, CREATININE, GLU No results found for: CALCIUM, ALKPHOS, AST, ALT, BILITOT, ESTGFRAFRICA, EGFRNONAA         ..No results found for: CHOL  No results found for: HDL  No results found for: LDLCALC  No results found for: TRIG  No results found for: CHOLHDL  ..No results found for: WBC, HGB, HCT, MCV, PLT    Test(s) Reviewed  I have reviewed the following in detail:  []  Stress test   [] Angiography   [] Echocardiogram   [] Labs   [] Other:       Assessment:         ICD-10-CM ICD-9-CM   1. Hypercholesterolemia E78.00 272.0   2. Bradycardia R00.1 427.89   3. Pre-hypertension R03.0 796.2     Problem List Items Addressed This Visit        Cardiac/Vascular    Hypercholesterolemia - Primary    Relevant Orders    Comprehensive metabolic panel    Lipid panel    Pre-hypertension    Relevant Orders    Comprehensive metabolic panel    Bradycardia    Relevant Orders    Comprehensive metabolic panel           Plan:     ALL CV CLINICALLY STABLE, NO ANGINA, NO HF, NO TIA, NO CLINICAL ARRHYTHMIA,CONTINUE CURRENT MEDS, EDUCATION, DIET, EXERCISE, GET LABS, WATCH BP/HR, RTC IN 6 MO WITH LABS      Hypercholesterolemia  -     Comprehensive metabolic panel; Future; Expected date: 10/20/2017  -     Lipid panel; Future; Expected date: 10/20/2017    Bradycardia  Comments:  NO SX  Orders:  -     Comprehensive metabolic panel; Future; Expected date: 10/20/2017    Pre-hypertension  Comments:  OK , MONITOR  Orders:  -     Comprehensive metabolic panel; Future; Expected date: 10/20/2017    RTC Low level/low impact aerobic exercise 5x's/wk. Heart healthy diet and risk factor modification.    See labs and med orders.    Aerobic exercise 5x's/wk. Heart healthy diet and risk factor modification.    See labs and med orders.

## 2017-11-03 ENCOUNTER — TELEPHONE (OUTPATIENT)
Dept: CARDIOLOGY | Facility: CLINIC | Age: 52
End: 2017-11-03

## 2017-11-03 NOTE — TELEPHONE ENCOUNTER
----- Message from Mallory Hayes sent at 11/3/2017  1:00 PM CDT -----  Contact: wife  Pt wife states pt was in couple weeks.lab work don at [facility]/nurse was not able to locate.Per wife she just got a bill and would like the nurse to look into it so can get the results,please...[phone]

## 2017-11-22 RX ORDER — ESCITALOPRAM OXALATE 5 MG/1
5 TABLET ORAL DAILY
Qty: 30 TABLET | Refills: 3 | Status: CANCELLED | OUTPATIENT
Start: 2017-11-22

## 2017-11-22 RX ORDER — ESCITALOPRAM OXALATE 5 MG/1
5 TABLET ORAL DAILY
Qty: 90 TABLET | Refills: 1 | Status: CANCELLED | OUTPATIENT
Start: 2017-11-22 | End: 2018-11-22

## 2017-11-22 NOTE — TELEPHONE ENCOUNTER
----- Message from Bree Hester sent at 11/22/2017  8:29 AM CST -----  Contact: wife- Lazara lopez  Needs refill on escitalopram oxalate (LEXAPRO) 5 MG Tab. Pharmacy contacted office last Friday on 11/17. Patient is out of medication. Please call back at [phone] (home)   [facility]/pharmacy #[id number] - [place] - [address]   [place] [address]  Phone: [phone] Fax: [phone]

## 2017-11-24 RX ORDER — ESCITALOPRAM OXALATE 5 MG/1
5 TABLET ORAL DAILY
Qty: 30 TABLET | Refills: 1 | Status: SHIPPED | OUTPATIENT
Start: 2017-11-24 | End: 2017-12-20 | Stop reason: SDUPTHER

## 2017-11-24 NOTE — TELEPHONE ENCOUNTER
Medication refill has been called in to [facility] with no refill. Patient called and stated that he is completely out.

## 2017-11-24 NOTE — TELEPHONE ENCOUNTER
----- Message from Samantha Gibbons sent at 11/24/2017 11:02 AM CST -----  Contact: patient spouse ivan pH#[phone]  patient spouse ivan pH#[phone]  Requesting a call from nurse, been calling for 1 week  Need refill on escitalopram   Completely out   [facility] [address]

## 2017-12-20 RX ORDER — ESCITALOPRAM OXALATE 5 MG/1
TABLET ORAL
Qty: 30 TABLET | Refills: 3 | Status: SHIPPED | OUTPATIENT
Start: 2017-12-20 | End: 2018-05-14 | Stop reason: SDUPTHER

## 2018-01-10 ENCOUNTER — TELEPHONE (OUTPATIENT)
Dept: CARDIOLOGY | Facility: CLINIC | Age: 53
End: 2018-01-10

## 2018-01-10 NOTE — TELEPHONE ENCOUNTER
----- Message from Geneva Chan sent at 1/10/2018  2:15 PM CST -----  Contact: wife, Lazara Avelar  Patient's wife, Lazara Avelar called asking for advice about getting patient a prescription for  Tamiflu.   Please call patient's wife at [phone]. Thanks!    [facility]/pharmacy #[id number] - [place] - [address]  [address]  [place] [address]  Phone: [phone] Fax: [phone]

## 2018-05-14 RX ORDER — ESCITALOPRAM OXALATE 5 MG/1
TABLET ORAL
Qty: 30 TABLET | Refills: 0 | Status: SHIPPED | OUTPATIENT
Start: 2018-05-14 | End: 2018-06-18 | Stop reason: SDUPTHER

## 2018-05-14 RX ORDER — PRAVASTATIN SODIUM 40 MG/1
TABLET ORAL
Qty: 30 TABLET | Refills: 0 | Status: SHIPPED | OUTPATIENT
Start: 2018-05-14 | End: 2018-06-23 | Stop reason: SDUPTHER

## 2018-05-21 ENCOUNTER — OFFICE VISIT (OUTPATIENT)
Dept: CARDIOLOGY | Facility: CLINIC | Age: 53
End: 2018-05-21
Payer: COMMERCIAL

## 2018-05-21 VITALS
HEIGHT: 72 IN | WEIGHT: 217.81 LBS | HEART RATE: 54 BPM | DIASTOLIC BLOOD PRESSURE: 84 MMHG | SYSTOLIC BLOOD PRESSURE: 131 MMHG | BODY MASS INDEX: 29.5 KG/M2

## 2018-05-21 DIAGNOSIS — I70.90 ATHEROSCLEROSIS OF ARTERIES: ICD-10-CM

## 2018-05-21 DIAGNOSIS — R03.0 PRE-HYPERTENSION: ICD-10-CM

## 2018-05-21 DIAGNOSIS — E78.00 HYPERCHOLESTEROLEMIA: Primary | ICD-10-CM

## 2018-05-21 DIAGNOSIS — R73.01 ELEVATED FASTING BLOOD SUGAR: ICD-10-CM

## 2018-05-21 PROCEDURE — 99999 PR PBB SHADOW E&M-EST. PATIENT-LVL III: CPT | Mod: PBBFAC,,, | Performed by: INTERNAL MEDICINE

## 2018-05-21 PROCEDURE — 99213 OFFICE O/P EST LOW 20 MIN: CPT | Mod: S$GLB,,, | Performed by: INTERNAL MEDICINE

## 2018-05-21 RX ORDER — LOSARTAN POTASSIUM 25 MG/1
12.5 TABLET ORAL NIGHTLY
Qty: 45 TABLET | Refills: 1 | Status: SHIPPED | OUTPATIENT
Start: 2018-05-21 | End: 2018-09-11 | Stop reason: SDUPTHER

## 2018-05-21 NOTE — PROGRESS NOTES
Subjective:    Patient ID:  Hakeem Avelar III is a 52 y.o. male who presents for Hyperlipidemia        HPI  DISCUSSED LABS AND GOALS, , HDL 76, , LFT'S OK CR 0.97, DOING WELL, EXCEPT FOR BACK,SEE ROS    Past Medical History:   Diagnosis Date    Aortic valve disorder     Cardiac murmur     Carotid artery occlusion     MINIMAL    Coronary artery disease     MINIMAL    Hyperlipidemia      Past Surgical History:   Procedure Laterality Date    TONSILLECTOMY       Family History   Problem Relation Age of Onset    Heart disease Mother     Heart disease Father      Social History     Social History    Marital status:      Spouse name: N/A    Number of children: N/A    Years of education: N/A     Social History Main Topics    Smoking status: Never Smoker    Smokeless tobacco: Never Used    Alcohol use No    Drug use: No    Sexual activity: Not on file     Other Topics Concern    Not on file     Social History Narrative    No narrative on file       Review of patient's allergies indicates:  No Known Allergies    Current Outpatient Prescriptions:     aspirin-acetaminophen-caffeine 250-250-65 mg (EXCEDRIN MIGRAINE) 250-250-65 mg per tablet, Take 2 tablets by mouth once daily., Disp: , Rfl:     escitalopram oxalate (LEXAPRO) 5 MG Tab, TAKE 1 TABLET BY MOUTH EVERY DAY, Disp: 30 tablet, Rfl: 0    loratadine (CLARITIN) 10 mg tablet, Take 10 mg by mouth daily as needed for Allergies., Disp: , Rfl:     pravastatin (PRAVACHOL) 40 MG tablet, 1 TABLET ONCE A DAY ORALLY 30 DAYS, Disp: 30 tablet, Rfl: 0    Review of Systems   Constitution: Negative for chills, diaphoresis, fever, weakness, malaise/fatigue and night sweats.   HENT: Negative for hearing loss and nosebleeds.         CHRONIC SINUS   Eyes: Negative for double vision and visual disturbance.   Cardiovascular: Negative for chest pain, claudication, cyanosis, dyspnea on exertion (MINIMAL), irregular heartbeat, leg swelling,  near-syncope, orthopnea, palpitations, paroxysmal nocturnal dyspnea and syncope.   Respiratory: Negative for cough, hemoptysis, shortness of breath and wheezing.    Endocrine: Negative for cold intolerance, heat intolerance and polyuria.   Hematologic/Lymphatic: Negative for adenopathy and bleeding problem. Does not bruise/bleed easily.   Skin: Negative for color change and rash.   Musculoskeletal: Negative for back pain (CHRONIC), falls and joint pain.   Gastrointestinal: Negative for abdominal pain, change in bowel habit, dysphagia, heartburn, hematemesis, jaundice and melena.   Genitourinary: Negative for dysuria, flank pain and frequency.   Neurological: Negative for brief paralysis, dizziness, focal weakness, light-headedness, loss of balance, numbness and tremors.   Psychiatric/Behavioral: Negative for altered mental status, depression and memory loss. The patient does not have insomnia and is not nervous/anxious (DOING WELL, MEDS).         Objective:      Vitals:    05/21/18 0806   BP: 131/84   Pulse: (!) 54   Weight: 98.8 kg (217 lb 13 oz)   Height: 6' (1.829 m)   PainSc: 0-No pain     Body mass index is 29.54 kg/m².    Physical Exam   Constitutional: He is oriented to person, place, and time. He appears well-developed and well-nourished. He is active.   HENT:   Head: Normocephalic and atraumatic.   Mouth/Throat: Oropharynx is clear and moist and mucous membranes are normal.   Eyes: Conjunctivae and EOM are normal. Pupils are equal, round, and reactive to light.   Neck: Normal range of motion. Neck supple. Normal carotid pulses, no hepatojugular reflux and no JVD present. Carotid bruit is not present. No tracheal deviation, no edema and no erythema present. No thyromegaly present.   Cardiovascular: Normal rate, regular rhythm and normal heart sounds.   No extrasystoles are present. PMI is not displaced.  Exam reveals no gallop, no distant heart sounds, no friction rub and no midsystolic click.    No murmur  heard.  Pulses:       Carotid pulses are 2+ on the right side, and 2+ on the left side.       Radial pulses are 2+ on the right side, and 2+ on the left side.        Femoral pulses are 2+ on the right side, and 2+ on the left side.       Dorsalis pedis pulses are 2+ on the right side, and 2+ on the left side.        Posterior tibial pulses are 2+ on the right side, and 2+ on the left side.   Pulmonary/Chest: Effort normal and breath sounds normal. No accessory muscle usage. No tachypnea and no bradypnea. No respiratory distress.   Abdominal: Soft. Bowel sounds are normal. He exhibits no distension. There is no hepatosplenomegaly. There is no tenderness. There is no CVA tenderness.   Musculoskeletal: Normal range of motion. He exhibits no edema or deformity.   Lymphadenopathy:     He has no cervical adenopathy.   Neurological: He is alert and oriented to person, place, and time. He has normal strength. He displays no tremor. No cranial nerve deficit. Coordination abnormal.   Skin: Skin is warm and dry. No cyanosis. No pallor.   Psychiatric: He has a normal mood and affect. His speech is normal and behavior is normal. Judgment and thought content normal.               ..    Chemistry    No results found for: NA, K, CL, CO2, BUN, CREATININE, GLU No results found for: CALCIUM, ALKPHOS, AST, ALT, BILITOT, ESTGFRAFRICA, EGFRNONAA         ..No results found for: CHOL  No results found for: HDL  No results found for: LDLCALC  No results found for: TRIG  No results found for: CHOLHDL  ..No results found for: WBC, HGB, HCT, MCV, PLT    Test(s) Reviewed  I have reviewed the following in detail:  [] Stress test   [] Angiography   [] Echocardiogram   [x] Labs   [] Other:       Assessment:         ICD-10-CM ICD-9-CM   1. Hypercholesterolemia E78.00 272.0   2. Atherosclerosis of arteries I70.8 440.8   3. Pre-hypertension R03.0 796.2   4. Elevated fasting blood sugar R73.01 790.21     Problem List Items Addressed This Visit         Cardiac/Vascular    Hypercholesterolemia - Primary    Atherosclerosis of arteries    Pre-hypertension       Endocrine    Elevated fasting blood sugar           Plan:         ADD LOW DOSE LOSARTAN FOR BP, ALL OTHER CV CLINICALLY STABLE, NO ANGINA, NO HF, NO TIA, NO CLINICAL ARRHYTHMIA,CONTINUE CURRENT MEDS, EDUCATION, DIET, EXERCISE, RTC IN 6 MO WITH LABS  Hypercholesterolemia    Atherosclerosis of arteries    Pre-hypertension    Elevated fasting blood sugar    RTC Low level/low impact aerobic exercise 5x's/wk. Heart healthy diet and risk factor modification.    See labs and med orders.    Aerobic exercise 5x's/wk. Heart healthy diet and risk factor modification.    See labs and med orders.

## 2018-06-18 RX ORDER — ESCITALOPRAM OXALATE 5 MG/1
TABLET ORAL
Qty: 30 TABLET | Refills: 5 | Status: SHIPPED | OUTPATIENT
Start: 2018-06-18 | End: 2018-09-11 | Stop reason: SDUPTHER

## 2018-06-23 RX ORDER — PRAVASTATIN SODIUM 40 MG/1
TABLET ORAL
Qty: 30 TABLET | Refills: 4 | Status: SHIPPED | OUTPATIENT
Start: 2018-06-23 | End: 2018-09-11 | Stop reason: SDUPTHER

## 2019-01-04 RX ORDER — PRAVASTATIN SODIUM 40 MG/1
TABLET ORAL
Qty: 30 TABLET | Refills: 4 | Status: SHIPPED | OUTPATIENT
Start: 2019-01-04 | End: 2019-06-24 | Stop reason: SDUPTHER

## 2019-02-12 RX ORDER — ESCITALOPRAM OXALATE 5 MG/1
TABLET ORAL
Qty: 30 TABLET | Refills: 5 | Status: SHIPPED | OUTPATIENT
Start: 2019-02-12 | End: 2019-10-07 | Stop reason: SDUPTHER

## 2019-05-09 ENCOUNTER — TELEPHONE (OUTPATIENT)
Dept: CARDIOLOGY | Facility: CLINIC | Age: 54
End: 2019-05-09

## 2019-05-10 ENCOUNTER — TELEPHONE (OUTPATIENT)
Dept: CARDIOLOGY | Facility: CLINIC | Age: 54
End: 2019-05-10

## 2019-05-10 NOTE — TELEPHONE ENCOUNTER
----- Message from Chuy Gambleosman sent at 5/10/2019  1:27 PM CDT -----  Contact: Wife/Kelli Pereira called in regarding the attached patient ().  Kelli stated she was returning a call to office from yesterday about patients needs orders to go to [facility] prior to his appt on 6/24/19.    Kelli's call back number is [phone]

## 2019-05-10 NOTE — TELEPHONE ENCOUNTER
Spoke to patient wife and advised that lab orders will be sent to [facility] on [address] before appt. Patient wife verbalized understanding.

## 2019-06-19 ENCOUNTER — TELEPHONE (OUTPATIENT)
Dept: CARDIOLOGY | Facility: CLINIC | Age: 54
End: 2019-06-19

## 2019-06-19 DIAGNOSIS — I70.90 ATHEROSCLEROSIS OF ARTERIES: ICD-10-CM

## 2019-06-19 DIAGNOSIS — E78.00 HYPERCHOLESTEROLEMIA: Primary | ICD-10-CM

## 2019-06-19 DIAGNOSIS — R03.0 PRE-HYPERTENSION: ICD-10-CM

## 2019-06-19 NOTE — TELEPHONE ENCOUNTER
Spoke to patient wife and advised that lab orders have been re-sent to [facility] on [address]. Patient wife verbalized understanding.

## 2019-06-19 NOTE — TELEPHONE ENCOUNTER
----- Message from Lorrie Shoemaker sent at 6/19/2019  9:39 AM CDT -----  Type: Needs Medical Advice    Who Called:  Wife (Lazara)  Best Call Back Number: [phone]  Additional Information: Wife stated [facility] on [address] does not have lab orders/patient went this morning and appointment is on Monday with doctor/please order and call back to advise.

## 2019-06-24 ENCOUNTER — OFFICE VISIT (OUTPATIENT)
Dept: CARDIOLOGY | Facility: CLINIC | Age: 54
End: 2019-06-24
Payer: COMMERCIAL

## 2019-06-24 VITALS
DIASTOLIC BLOOD PRESSURE: 78 MMHG | HEIGHT: 72 IN | BODY MASS INDEX: 29.92 KG/M2 | WEIGHT: 220.88 LBS | SYSTOLIC BLOOD PRESSURE: 117 MMHG | HEART RATE: 73 BPM

## 2019-06-24 DIAGNOSIS — R03.0 PRE-HYPERTENSION: ICD-10-CM

## 2019-06-24 DIAGNOSIS — Z82.49 FAMILY HISTORY OF PREMATURE CAD: ICD-10-CM

## 2019-06-24 DIAGNOSIS — E78.00 HYPERCHOLESTEROLEMIA: Primary | ICD-10-CM

## 2019-06-24 PROCEDURE — 99999 PR PBB SHADOW E&M-EST. PATIENT-LVL III: ICD-10-PCS | Mod: PBBFAC,,, | Performed by: INTERNAL MEDICINE

## 2019-06-24 PROCEDURE — 99213 OFFICE O/P EST LOW 20 MIN: CPT | Mod: S$GLB,,, | Performed by: INTERNAL MEDICINE

## 2019-06-24 PROCEDURE — 99213 PR OFFICE/OUTPT VISIT, EST, LEVL III, 20-29 MIN: ICD-10-PCS | Mod: S$GLB,,, | Performed by: INTERNAL MEDICINE

## 2019-06-24 PROCEDURE — 99999 PR PBB SHADOW E&M-EST. PATIENT-LVL III: CPT | Mod: PBBFAC,,, | Performed by: INTERNAL MEDICINE

## 2019-06-24 RX ORDER — PRAVASTATIN SODIUM 40 MG/1
TABLET ORAL
Qty: 90 TABLET | Refills: 2 | Status: SHIPPED | OUTPATIENT
Start: 2019-06-24 | End: 2020-02-24 | Stop reason: SDUPTHER

## 2019-06-24 NOTE — PROGRESS NOTES
Subjective:    Patient ID:  Hakeem Avelar III is a 53 y.o. male who presents for Hyperlipidemia and Hypertension        HPI    Discussed labs and goals, cmp ok, ldl 109, hdl 50, doing well,NOT TAKING LOSARTAN SINCE NOTIFICATION, BP OK,  NO CHEST PAIN, NO SHORTNESS OF BREATH, NO TIA SYMPTOMS, NO PALPITATION, SEE REVIEW OF SYSTEM  Past Medical History:   Diagnosis Date    Aortic valve disorder     Cardiac murmur     Carotid artery occlusion     MINIMAL    Coronary artery disease     MINIMAL    Hyperlipidemia      Past Surgical History:   Procedure Laterality Date    TONSILLECTOMY       Family History   Problem Relation Age of Onset    Heart disease Mother     Heart disease Father      Social History     Socioeconomic History    Marital status:      Spouse name: Not on file    Number of children: Not on file    Years of education: Not on file    Highest education level: Not on file   Occupational History    Not on file   Social Needs    Financial resource strain: Not on file    Food insecurity:     Worry: Not on file     Inability: Not on file    Transportation needs:     Medical: Not on file     Non-medical: Not on file   Tobacco Use    Smoking status: Never Smoker    Smokeless tobacco: Never Used   Substance and Sexual Activity    Alcohol use: No    Drug use: No    Sexual activity: Yes     Partners: Female   Lifestyle    Physical activity:     Days per week: Not on file     Minutes per session: Not on file    Stress: Not on file   Relationships    Social connections:     Talks on phone: Not on file     Gets together: Not on file     Attends Mu-ism service: Not on file     Active member of club or organization: Not on file     Attends meetings of clubs or organizations: Not on file     Relationship status: Not on file   Other Topics Concern    Not on file   Social History Narrative    Not on file       Review of patient's allergies indicates:  No Known Allergies    Current  Outpatient Medications:     aspirin-acetaminophen-caffeine 250-250-65 mg (EXCEDRIN MIGRAINE) 250-250-65 mg per tablet, Take 2 tablets by mouth once daily., Disp: , Rfl:     escitalopram oxalate (LEXAPRO) 5 MG Tab, Take 1 tablet (5 mg total) by mouth once daily., Disp: 30 tablet, Rfl: 5    escitalopram oxalate (LEXAPRO) 5 MG Tab, TAKE 1 TABLET BY MOUTH EVERY DAY, Disp: 30 tablet, Rfl: 5    loratadine (CLARITIN) 10 mg tablet, Take 1 tablet (10 mg total) by mouth daily as needed for Allergies., Disp: , Rfl:     pravastatin (PRAVACHOL) 40 MG tablet, TAKE 1 TABLET ONCE A DAY ORALLY 30 DAYS, Disp: 30 tablet, Rfl: 4    pravastatin (PRAVACHOL) 40 MG tablet, TAKE 1 TABLET ONCE A DAY ORALLY 30 DAYS, Disp: 90 tablet, Rfl: 2    Review of Systems   Constitution: Negative for chills, diaphoresis, fever, malaise/fatigue and night sweats.   HENT: Negative for hearing loss and nosebleeds.         CHRONIC SINUS   Eyes: Negative for blurred vision and visual disturbance.   Cardiovascular: Negative for chest pain, claudication, cyanosis, dyspnea on exertion (MINIMAL), irregular heartbeat, leg swelling, near-syncope, orthopnea, palpitations, paroxysmal nocturnal dyspnea and syncope.   Respiratory: Negative for cough, hemoptysis, shortness of breath and wheezing.    Endocrine: Negative for cold intolerance, heat intolerance and polyuria.   Hematologic/Lymphatic: Negative for adenopathy and bleeding problem. Does not bruise/bleed easily.   Skin: Negative for color change and rash.   Musculoskeletal: Negative for back pain (CHRONIC), falls and joint pain.   Gastrointestinal: Negative for abdominal pain, change in bowel habit, dysphagia, heartburn, hematemesis, jaundice and melena.   Genitourinary: Negative for dysuria, flank pain and frequency.   Neurological: Negative for brief paralysis, dizziness, focal weakness, light-headedness, loss of balance, numbness, tremors and weakness.   Psychiatric/Behavioral: Negative for altered  mental status, depression and memory loss. The patient does not have insomnia and is not nervous/anxious (DOING WELL, MEDS).    Allergic/Immunologic: Negative for hives and persistent infections.        Objective:      Vitals:    06/24/19 1310   BP: 117/78   Pulse: 73   Weight: 100.2 kg (220 lb 14.4 oz)   Height: 6' (1.829 m)   PainSc: 0-No pain     Body mass index is 29.96 kg/m².    Physical Exam   Constitutional: He is oriented to person, place, and time. He appears well-developed and well-nourished. He is active.   HENT:   Head: Normocephalic and atraumatic.   Mouth/Throat: Oropharynx is clear and moist and mucous membranes are normal.   Eyes: Pupils are equal, round, and reactive to light. Conjunctivae and EOM are normal.   Neck: Normal range of motion. Neck supple. Normal carotid pulses, no hepatojugular reflux and no JVD present. Carotid bruit is not present. No edema and no erythema present. No thyromegaly present.   Cardiovascular: Normal rate, regular rhythm and normal heart sounds.  No extrasystoles are present. PMI is not displaced. Exam reveals no gallop, no distant heart sounds, no friction rub and no midsystolic click.   No murmur heard.  Pulses:       Carotid pulses are 2+ on the right side, and 2+ on the left side.       Radial pulses are 2+ on the right side, and 2+ on the left side.        Femoral pulses are 2+ on the right side, and 2+ on the left side.       Dorsalis pedis pulses are 2+ on the right side, and 2+ on the left side.        Posterior tibial pulses are 2+ on the right side, and 2+ on the left side.   Pulmonary/Chest: Effort normal and breath sounds normal. No accessory muscle usage. No tachypnea and no bradypnea. No respiratory distress.   Abdominal: Soft. Bowel sounds are normal. He exhibits no distension and no mass. There is no hepatosplenomegaly. There is no CVA tenderness.   Musculoskeletal: Normal range of motion. He exhibits no edema or deformity.   Lymphadenopathy:     He has  no cervical adenopathy.   Neurological: He is alert and oriented to person, place, and time. He has normal strength. He displays no tremor. No cranial nerve deficit. Coordination normal.   Skin: Skin is warm and dry. No cyanosis. No pallor.   Psychiatric: He has a normal mood and affect. His speech is normal and behavior is normal.               ..    Chemistry        Component Value Date/Time     06/19/2019 1059    K 4.6 06/19/2019 1059     06/19/2019 1059    CO2 27 06/19/2019 1059    BUN 22 06/19/2019 1059    CREATININE 0.87 06/19/2019 1059    GLU 90 06/19/2019 1059        Component Value Date/Time    CALCIUM 9.8 06/19/2019 1059    ALKPHOS 98 06/19/2019 1059    AST 25 06/19/2019 1059    ALT 25 06/19/2019 1059    BILITOT 0.4 06/19/2019 1059    ESTGFRAFRICA 114 06/19/2019 1059    EGFRNONAA 99 06/19/2019 1059            ..  Lab Results   Component Value Date    CHOL 184 06/19/2019     Lab Results   Component Value Date    HDL 50 06/19/2019     Lab Results   Component Value Date    LDLCALC 109 (H) 06/19/2019     Lab Results   Component Value Date    TRIG 141 06/19/2019     Lab Results   Component Value Date    CHOLHDL 3.7 06/19/2019     ..No results found for: WBC, HGB, HCT, MCV, PLT    Test(s) Reviewed  I have reviewed the following in detail:  [] Stress test   [] Angiography   [] Echocardiogram   [x] Labs   [] Other:       Assessment:         ICD-10-CM ICD-9-CM   1. Hypercholesterolemia E78.00 272.0   2. Pre-hypertension R03.0 796.2   3. Family history of premature CAD Z82.49 V17.3     Problem List Items Addressed This Visit        Cardiac/Vascular    Hypercholesterolemia - Primary    Relevant Orders    Comprehensive metabolic panel    Pre-hypertension    Relevant Orders    Comprehensive metabolic panel       Other    Family history of premature CAD    Relevant Orders    Comprehensive metabolic panel           Plan:         ALL CV CLINICALLY STABLE, NO ANGINA, NO HF, NO TIA, NO CLINICAL  ARRHYTHMIA,CONTINUE CURRENT MEDS, EDUCATION, DIET, EXERCISE, WATCH BP, RTC IN 8-9 MO WITH LABS  Hypercholesterolemia  -     Comprehensive metabolic panel; Future; Expected date: 06/24/2019    Pre-hypertension  -     Comprehensive metabolic panel; Future; Expected date: 06/24/2019    Family history of premature CAD  -     Comprehensive metabolic panel; Future; Expected date: 06/24/2019    Other orders  -     pravastatin (PRAVACHOL) 40 MG tablet; TAKE 1 TABLET ONCE A DAY ORALLY 30 DAYS  Dispense: 90 tablet; Refill: 2    RTC Low level/low impact aerobic exercise 5x's/wk. Heart healthy diet and risk factor modification.    See labs and med orders.    Aerobic exercise 5x's/wk. Heart healthy diet and risk factor modification.    See labs and med orders.

## 2019-10-07 RX ORDER — ESCITALOPRAM OXALATE 5 MG/1
TABLET ORAL
Qty: 30 TABLET | Refills: 5 | Status: SHIPPED | OUTPATIENT
Start: 2019-10-07 | End: 2020-05-31

## 2019-12-19 LAB
ALBUMIN SERPL-MCNC: 4.5 G/DL (ref 3.6–5.1)
ALBUMIN/GLOB SERPL: 1.4 (CALC) (ref 1–2.5)
ALP SERPL-CCNC: 98 U/L (ref 40–115)
ALT SERPL-CCNC: 25 U/L (ref 9–46)
AST SERPL-CCNC: 25 U/L (ref 10–35)
BILIRUB SERPL-MCNC: 0.4 MG/DL (ref 0.2–1.2)
BUN SERPL-MCNC: 22 MG/DL (ref 7–25)
BUN/CREAT SERPL: NORMAL (CALC) (ref 6–22)
CALCIUM SERPL-MCNC: 9.8 MG/DL (ref 8.6–10.3)
CHLORIDE SERPL-SCNC: 103 MMOL/L (ref 98–110)
CHOLEST SERPL-MCNC: 184 MG/DL
CHOLEST/HDLC SERPL: 3.7 (CALC)
CO2 SERPL-SCNC: 27 MMOL/L (ref 20–32)
CREAT SERPL-MCNC: 0.87 MG/DL (ref 0.7–1.33)
GFRSERPLBLD MDRD-ARVRAT: 99 ML/MIN/1.73M2
GLOBULIN SER CALC-MCNC: 3.2 G/DL (CALC) (ref 1.9–3.7)
GLUCOSE SERPL-MCNC: 90 MG/DL (ref 65–99)
HDLC SERPL-MCNC: 50 MG/DL
LDLC SERPL CALC-MCNC: 109 MG/DL (CALC)
NONHDLC SERPL-MCNC: 134 MG/DL (CALC)
POTASSIUM SERPL-SCNC: 4.6 MMOL/L (ref 3.5–5.3)
PROT SERPL-MCNC: 7.7 G/DL (ref 6.1–8.1)
SODIUM SERPL-SCNC: 140 MMOL/L (ref 135–146)
TRIGL SERPL-MCNC: 141 MG/DL

## 2020-02-24 ENCOUNTER — OFFICE VISIT (OUTPATIENT)
Dept: CARDIOLOGY | Facility: CLINIC | Age: 55
End: 2020-02-24
Payer: COMMERCIAL

## 2020-02-24 VITALS
HEIGHT: 72 IN | WEIGHT: 225.75 LBS | BODY MASS INDEX: 30.58 KG/M2 | HEART RATE: 82 BPM | SYSTOLIC BLOOD PRESSURE: 125 MMHG | DIASTOLIC BLOOD PRESSURE: 72 MMHG

## 2020-02-24 DIAGNOSIS — R03.0 PRE-HYPERTENSION: ICD-10-CM

## 2020-02-24 DIAGNOSIS — R73.01 ELEVATED FASTING BLOOD SUGAR: ICD-10-CM

## 2020-02-24 DIAGNOSIS — E78.00 HYPERCHOLESTEROLEMIA: Primary | ICD-10-CM

## 2020-02-24 DIAGNOSIS — I70.90 ATHEROSCLEROSIS OF ARTERIES: ICD-10-CM

## 2020-02-24 DIAGNOSIS — E66.9 OBESITY, CLASS I, BMI 30-34.9: ICD-10-CM

## 2020-02-24 DIAGNOSIS — R53.83 OTHER FATIGUE: ICD-10-CM

## 2020-02-24 PROBLEM — E66.811 OBESITY, CLASS I, BMI 30-34.9: Status: ACTIVE | Noted: 2020-02-24

## 2020-02-24 PROCEDURE — 99999 PR PBB SHADOW E&M-EST. PATIENT-LVL III: CPT | Mod: PBBFAC,,, | Performed by: INTERNAL MEDICINE

## 2020-02-24 PROCEDURE — 99214 OFFICE O/P EST MOD 30 MIN: CPT | Mod: S$GLB,,, | Performed by: INTERNAL MEDICINE

## 2020-02-24 PROCEDURE — 99214 PR OFFICE/OUTPT VISIT, EST, LEVL IV, 30-39 MIN: ICD-10-PCS | Mod: S$GLB,,, | Performed by: INTERNAL MEDICINE

## 2020-02-24 PROCEDURE — 99999 PR PBB SHADOW E&M-EST. PATIENT-LVL III: ICD-10-PCS | Mod: PBBFAC,,, | Performed by: INTERNAL MEDICINE

## 2020-02-24 NOTE — PROGRESS NOTES
Subjective:    Patient ID:  Hakeem Avelar III is a 54 y.o. male who presents for Hyperlipidemia        HPI  MISSED LABS, DOING WELL, LESS ENERGY, NO CHEST PAIN, NO SHORTNESS OF BREATH, NO TIA TYPE SYMPTOMS, NO NEAR-SYNCOPE, SEE ROS    Past Medical History:   Diagnosis Date    Aortic valve disorder     Cardiac murmur     Carotid artery occlusion     MINIMAL    Coronary artery disease     MINIMAL    Hyperlipidemia      Past Surgical History:   Procedure Laterality Date    TONSILLECTOMY       Family History   Problem Relation Age of Onset    Heart disease Mother     Heart disease Father      Social History     Socioeconomic History    Marital status:      Spouse name: Not on file    Number of children: Not on file    Years of education: Not on file    Highest education level: Not on file   Occupational History    Not on file   Social Needs    Financial resource strain: Not on file    Food insecurity:     Worry: Not on file     Inability: Not on file    Transportation needs:     Medical: Not on file     Non-medical: Not on file   Tobacco Use    Smoking status: Never Smoker    Smokeless tobacco: Never Used   Substance and Sexual Activity    Alcohol use: No    Drug use: No    Sexual activity: Yes     Partners: Female   Lifestyle    Physical activity:     Days per week: Not on file     Minutes per session: Not on file    Stress: Not on file   Relationships    Social connections:     Talks on phone: Not on file     Gets together: Not on file     Attends Bahai service: Not on file     Active member of club or organization: Not on file     Attends meetings of clubs or organizations: Not on file     Relationship status: Not on file   Other Topics Concern    Not on file   Social History Narrative    Not on file       Review of patient's allergies indicates:  No Known Allergies    Current Outpatient Medications:     aspirin-acetaminophen-caffeine 250-250-65 mg (EXCEDRIN MIGRAINE)  250-250-65 mg per tablet, Take 2 tablets by mouth once daily., Disp: , Rfl:     escitalopram oxalate (LEXAPRO) 5 MG Tab, TAKE 1 TABLET BY MOUTH EVERY DAY, Disp: 30 tablet, Rfl: 5    loratadine (CLARITIN) 10 mg tablet, Take 1 tablet (10 mg total) by mouth daily as needed for Allergies., Disp: , Rfl:     pravastatin (PRAVACHOL) 40 MG tablet, TAKE 1 TABLET ONCE A DAY ORALLY 30 DAYS, Disp: 30 tablet, Rfl: 4    Review of Systems   Constitution: Negative for chills, diaphoresis, fever, malaise/fatigue and night sweats. Weight gain: SOME.   HENT: Negative for hearing loss and nosebleeds.         CHRONIC SINUS   Eyes: Negative for blurred vision and visual disturbance.   Cardiovascular: Negative for chest pain, claudication, cyanosis, dyspnea on exertion (MINIMAL), irregular heartbeat, leg swelling, near-syncope, orthopnea, palpitations, paroxysmal nocturnal dyspnea and syncope.   Respiratory: Negative for cough, hemoptysis, shortness of breath and wheezing.    Endocrine: Negative for cold intolerance, heat intolerance and polyuria.   Hematologic/Lymphatic: Negative for adenopathy and bleeding problem. Does not bruise/bleed easily.   Skin: Negative for color change and rash.   Musculoskeletal: Negative for back pain (CHRONIC), falls and joint pain.   Gastrointestinal: Negative for abdominal pain, change in bowel habit, dysphagia, heartburn, hematemesis, jaundice and melena.   Genitourinary: Negative for dysuria, flank pain and frequency.   Neurological: Negative for brief paralysis, dizziness, focal weakness, light-headedness, loss of balance, numbness, tremors and weakness.   Psychiatric/Behavioral: Negative for altered mental status, depression and memory loss. The patient does not have insomnia and is not nervous/anxious (DOING WELL, MEDS).    Allergic/Immunologic: Negative for hives and persistent infections.        Objective:      Vitals:    02/24/20 1300   BP: 125/72   Pulse: 82   Weight: 102.4 kg (225 lb 12 oz)    Height: 6' (1.829 m)   PainSc:   4   PainLoc: Back     Body mass index is 30.62 kg/m².    Physical Exam   Constitutional: He is oriented to person, place, and time. He appears well-developed and well-nourished. He is active.   HENT:   Head: Normocephalic and atraumatic.   Mouth/Throat: Oropharynx is clear and moist and mucous membranes are normal.   Eyes: Pupils are equal, round, and reactive to light. Conjunctivae and EOM are normal.   Neck: Normal range of motion. Neck supple. Normal carotid pulses, no hepatojugular reflux and no JVD present. Carotid bruit is not present. No edema and no erythema present. No thyromegaly present.   Cardiovascular: Normal rate, regular rhythm and normal heart sounds.  No extrasystoles are present. PMI is not displaced. Exam reveals no gallop, no distant heart sounds, no friction rub and no midsystolic click.   No murmur heard.  Pulses:       Carotid pulses are 2+ on the right side, and 2+ on the left side.       Radial pulses are 2+ on the right side, and 2+ on the left side.        Femoral pulses are 2+ on the right side, and 2+ on the left side.       Dorsalis pedis pulses are 2+ on the right side, and 2+ on the left side.        Posterior tibial pulses are 2+ on the right side, and 2+ on the left side.   Pulmonary/Chest: Effort normal and breath sounds normal. No accessory muscle usage. No tachypnea and no bradypnea. No respiratory distress.   Abdominal: Soft. Bowel sounds are normal. He exhibits no distension and no mass. There is no hepatosplenomegaly. There is no CVA tenderness.   Musculoskeletal: Normal range of motion. He exhibits no edema or deformity.   Lymphadenopathy:     He has no cervical adenopathy.   Neurological: He is alert and oriented to person, place, and time. He has normal strength. He displays no tremor. No cranial nerve deficit.   Skin: Skin is warm and dry. No cyanosis. No pallor.   Psychiatric: He has a normal mood and affect. His speech is normal and  behavior is normal.               ..    Chemistry        Component Value Date/Time     06/19/2019 1059    K 4.6 06/19/2019 1059     06/19/2019 1059    CO2 27 06/19/2019 1059    BUN 22 06/19/2019 1059    CREATININE 0.87 06/19/2019 1059    GLU 90 06/19/2019 1059        Component Value Date/Time    CALCIUM 9.8 06/19/2019 1059    ALKPHOS 98 06/19/2019 1059    AST 25 06/19/2019 1059    ALT 25 06/19/2019 1059    BILITOT 0.4 06/19/2019 1059    ESTGFRAFRICA 114 06/19/2019 1059    EGFRNONAA 99 06/19/2019 1059            ..  Lab Results   Component Value Date    CHOL 184 06/19/2019     Lab Results   Component Value Date    HDL 50 06/19/2019     Lab Results   Component Value Date    LDLCALC 109 (H) 06/19/2019     Lab Results   Component Value Date    TRIG 141 06/19/2019     Lab Results   Component Value Date    CHOLHDL 3.7 06/19/2019     ..No results found for: WBC, HGB, HCT, MCV, PLT    Test(s) Reviewed  I have reviewed the following in detail:  [] Stress test   [] Angiography   [] Echocardiogram   [] Labs   [x] Other:       Assessment:         ICD-10-CM ICD-9-CM   1. Hypercholesterolemia E78.00 272.0   2. Atherosclerosis of arteries I70.8 440.8   3. Pre-hypertension R03.0 796.2   4. Elevated fasting blood sugar R73.01 790.21   5. Other fatigue R53.83 780.79   6. Obesity, Class I, BMI 30-34.9 E66.9 278.00     Problem List Items Addressed This Visit        Cardiac/Vascular    Hypercholesterolemia - Primary    Relevant Orders    Comprehensive metabolic panel    Lipid panel    TSH    TESTOSTERONE    CBC auto differential    PSA, Screening    Atherosclerosis of arteries    Relevant Orders    Comprehensive metabolic panel    TSH    TESTOSTERONE    CBC auto differential    PSA, Screening    Pre-hypertension    Relevant Orders    Comprehensive metabolic panel    TSH    TESTOSTERONE    CBC auto differential    PSA, Screening       Endocrine    Elevated fasting blood sugar    Relevant Orders    Comprehensive metabolic  panel    TSH    TESTOSTERONE    CBC auto differential    Hemoglobin A1c    PSA, Screening    Obesity, Class I, BMI 30-34.9       Other    Other fatigue    Relevant Orders    Comprehensive metabolic panel    TSH    TESTOSTERONE    CBC auto differential    PSA, Screening           Plan:     LABS SOON/ [facility], ALL CV CLINICALLY STABLE, NO ANGINA, NO HF, NO TIA, NO CLINICAL ARRHYTHMIA,CONTINUE CURRENT MEDS, EDUCATION, DIET, EXERCISE, WEIGHT LOSS, RETURN TO CLINIC IN 6 MONTHS      Hypercholesterolemia  -     Comprehensive metabolic panel; Future; Expected date: 02/24/2020  -     Lipid panel; Future; Expected date: 02/24/2020  -     TSH; Future; Expected date: 02/24/2020  -     TESTOSTERONE; Future; Expected date: 02/24/2020  -     CBC auto differential; Future; Expected date: 02/24/2020  -     PSA, Screening; Future; Expected date: 02/24/2020    Atherosclerosis of arteries  -     Comprehensive metabolic panel; Future; Expected date: 02/24/2020  -     TSH; Future; Expected date: 02/24/2020  -     TESTOSTERONE; Future; Expected date: 02/24/2020  -     CBC auto differential; Future; Expected date: 02/24/2020  -     PSA, Screening; Future; Expected date: 02/24/2020    Pre-hypertension  -     Comprehensive metabolic panel; Future; Expected date: 02/24/2020  -     TSH; Future; Expected date: 02/24/2020  -     TESTOSTERONE; Future; Expected date: 02/24/2020  -     CBC auto differential; Future; Expected date: 02/24/2020  -     PSA, Screening; Future; Expected date: 02/24/2020    Elevated fasting blood sugar  -     Comprehensive metabolic panel; Future; Expected date: 02/24/2020  -     TSH; Future; Expected date: 02/24/2020  -     TESTOSTERONE; Future; Expected date: 02/24/2020  -     CBC auto differential; Future; Expected date: 02/24/2020  -     Hemoglobin A1c; Future; Expected date: 02/24/2020  -     PSA, Screening; Future; Expected date: 02/24/2020    Other fatigue  -     Comprehensive metabolic panel; Future; Expected date:  02/24/2020  -     TSH; Future; Expected date: 02/24/2020  -     TESTOSTERONE; Future; Expected date: 02/24/2020  -     CBC auto differential; Future; Expected date: 02/24/2020  -     PSA, Screening; Future; Expected date: 02/24/2020    Obesity, Class I, BMI 30-34.9    RTC Low level/low impact aerobic exercise 5x's/wk. Heart healthy diet and risk factor modification.    See labs and med orders.    Aerobic exercise 5x's/wk. Heart healthy diet and risk factor modification.    See labs and med orders.

## 2020-03-05 LAB
ALBUMIN SERPL-MCNC: 4.4 G/DL (ref 3.6–5.1)
ALBUMIN/GLOB SERPL: 1.4 (CALC) (ref 1–2.5)
ALP SERPL-CCNC: 77 U/L (ref 35–144)
ALT SERPL-CCNC: 24 U/L (ref 9–46)
AST SERPL-CCNC: 22 U/L (ref 10–35)
BASOPHILS # BLD AUTO: 32 CELLS/UL (ref 0–200)
BASOPHILS NFR BLD AUTO: 0.6 %
BILIRUB SERPL-MCNC: 0.5 MG/DL (ref 0.2–1.2)
BUN SERPL-MCNC: 22 MG/DL (ref 7–25)
BUN/CREAT SERPL: NORMAL (CALC) (ref 6–22)
CALCIUM SERPL-MCNC: 9.6 MG/DL (ref 8.6–10.3)
CHLORIDE SERPL-SCNC: 104 MMOL/L (ref 98–110)
CHOLEST SERPL-MCNC: 190 MG/DL
CHOLEST/HDLC SERPL: 3.7 (CALC)
CO2 SERPL-SCNC: 27 MMOL/L (ref 20–32)
CREAT SERPL-MCNC: 0.94 MG/DL (ref 0.7–1.33)
EOSINOPHIL # BLD AUTO: 159 CELLS/UL (ref 15–500)
EOSINOPHIL NFR BLD AUTO: 3 %
ERYTHROCYTE [DISTWIDTH] IN BLOOD BY AUTOMATED COUNT: 13.1 % (ref 11–15)
GFRSERPLBLD MDRD-ARVRAT: 92 ML/MIN/1.73M2
GLOBULIN SER CALC-MCNC: 3.2 G/DL (CALC) (ref 1.9–3.7)
GLUCOSE SERPL-MCNC: 98 MG/DL (ref 65–99)
HBA1C MFR BLD: 5.8 % OF TOTAL HGB
HCT VFR BLD AUTO: 44.5 % (ref 38.5–50)
HDLC SERPL-MCNC: 51 MG/DL
HGB BLD-MCNC: 14.4 G/DL (ref 13.2–17.1)
LDLC SERPL CALC-MCNC: 119 MG/DL (CALC)
LYMPHOCYTES # BLD AUTO: 1913 CELLS/UL (ref 850–3900)
LYMPHOCYTES NFR BLD AUTO: 36.1 %
MCH RBC QN AUTO: 28.4 PG (ref 27–33)
MCHC RBC AUTO-ENTMCNC: 32.4 G/DL (ref 32–36)
MCV RBC AUTO: 87.8 FL (ref 80–100)
MONOCYTES # BLD AUTO: 599 CELLS/UL (ref 200–950)
MONOCYTES NFR BLD AUTO: 11.3 %
NEUTROPHILS # BLD AUTO: 2597 CELLS/UL (ref 1500–7800)
NEUTROPHILS NFR BLD AUTO: 49 %
NONHDLC SERPL-MCNC: 139 MG/DL (CALC)
PLATELET # BLD AUTO: 251 THOUSAND/UL (ref 140–400)
PMV BLD REES-ECKER: 10.3 FL (ref 7.5–12.5)
POTASSIUM SERPL-SCNC: 4.5 MMOL/L (ref 3.5–5.3)
PROT SERPL-MCNC: 7.6 G/DL (ref 6.1–8.1)
PSA SERPL-MCNC: 0.4 NG/ML
RBC # BLD AUTO: 5.07 MILLION/UL (ref 4.2–5.8)
SODIUM SERPL-SCNC: 139 MMOL/L (ref 135–146)
TESTOST SERPL-MCNC: 487 NG/DL (ref 250–827)
TRIGL SERPL-MCNC: 95 MG/DL
TSH SERPL-ACNC: 1 MIU/L (ref 0.4–4.5)
WBC # BLD AUTO: 5.3 THOUSAND/UL (ref 3.8–10.8)

## 2020-05-31 RX ORDER — ESCITALOPRAM OXALATE 5 MG/1
TABLET ORAL
Qty: 90 TABLET | Refills: 1 | Status: SHIPPED | OUTPATIENT
Start: 2020-05-31 | End: 2021-02-18

## 2021-01-07 ENCOUNTER — OFFICE VISIT (OUTPATIENT)
Dept: CARDIOLOGY | Facility: CLINIC | Age: 56
End: 2021-01-07
Payer: COMMERCIAL

## 2021-01-07 VITALS
HEIGHT: 72 IN | BODY MASS INDEX: 30.31 KG/M2 | WEIGHT: 223.75 LBS | HEART RATE: 66 BPM | SYSTOLIC BLOOD PRESSURE: 132 MMHG | DIASTOLIC BLOOD PRESSURE: 83 MMHG

## 2021-01-07 DIAGNOSIS — I70.90 ATHEROSCLEROSIS OF ARTERIES: ICD-10-CM

## 2021-01-07 DIAGNOSIS — R03.0 PRE-HYPERTENSION: ICD-10-CM

## 2021-01-07 DIAGNOSIS — E78.00 HYPERCHOLESTEROLEMIA: Primary | ICD-10-CM

## 2021-01-07 DIAGNOSIS — Z82.49 FAMILY HISTORY OF PREMATURE CAD: ICD-10-CM

## 2021-01-07 DIAGNOSIS — E66.9 OBESITY, CLASS I, BMI 30-34.9: ICD-10-CM

## 2021-01-07 DIAGNOSIS — R00.1 BRADYCARDIA: ICD-10-CM

## 2021-01-07 PROCEDURE — 99999 PR PBB SHADOW E&M-EST. PATIENT-LVL III: CPT | Mod: PBBFAC,,, | Performed by: INTERNAL MEDICINE

## 2021-01-07 PROCEDURE — 99999 PR PBB SHADOW E&M-EST. PATIENT-LVL III: ICD-10-PCS | Mod: PBBFAC,,, | Performed by: INTERNAL MEDICINE

## 2021-01-07 PROCEDURE — 3008F BODY MASS INDEX DOCD: CPT | Mod: CPTII,S$GLB,, | Performed by: INTERNAL MEDICINE

## 2021-01-07 PROCEDURE — 3079F DIAST BP 80-89 MM HG: CPT | Mod: CPTII,S$GLB,, | Performed by: INTERNAL MEDICINE

## 2021-01-07 PROCEDURE — 3075F SYST BP GE 130 - 139MM HG: CPT | Mod: CPTII,S$GLB,, | Performed by: INTERNAL MEDICINE

## 2021-01-07 PROCEDURE — 1125F PR PAIN SEVERITY QUANTIFIED, PAIN PRESENT: ICD-10-PCS | Mod: S$GLB,,, | Performed by: INTERNAL MEDICINE

## 2021-01-07 PROCEDURE — 1125F AMNT PAIN NOTED PAIN PRSNT: CPT | Mod: S$GLB,,, | Performed by: INTERNAL MEDICINE

## 2021-01-07 PROCEDURE — 3075F PR MOST RECENT SYSTOLIC BLOOD PRESS GE 130-139MM HG: ICD-10-PCS | Mod: CPTII,S$GLB,, | Performed by: INTERNAL MEDICINE

## 2021-01-07 PROCEDURE — 99214 OFFICE O/P EST MOD 30 MIN: CPT | Mod: S$GLB,,, | Performed by: INTERNAL MEDICINE

## 2021-01-07 PROCEDURE — 3079F PR MOST RECENT DIASTOLIC BLOOD PRESSURE 80-89 MM HG: ICD-10-PCS | Mod: CPTII,S$GLB,, | Performed by: INTERNAL MEDICINE

## 2021-01-07 PROCEDURE — 99214 PR OFFICE/OUTPT VISIT, EST, LEVL IV, 30-39 MIN: ICD-10-PCS | Mod: S$GLB,,, | Performed by: INTERNAL MEDICINE

## 2021-01-07 PROCEDURE — 3008F PR BODY MASS INDEX (BMI) DOCUMENTED: ICD-10-PCS | Mod: CPTII,S$GLB,, | Performed by: INTERNAL MEDICINE

## 2021-01-07 RX ORDER — MOMETASONE FUROATE 50 UG/1
2 SPRAY, METERED NASAL DAILY
COMMUNITY

## 2021-01-07 RX ORDER — PRAVASTATIN SODIUM 40 MG/1
40 TABLET ORAL DAILY
Qty: 90 TABLET | Refills: 1 | Status: SHIPPED | OUTPATIENT
Start: 2021-01-07 | End: 2021-09-03

## 2022-01-28 ENCOUNTER — OFFICE VISIT (OUTPATIENT)
Dept: CARDIOLOGY | Facility: CLINIC | Age: 57
End: 2022-01-28
Payer: COMMERCIAL

## 2022-01-28 VITALS
BODY MASS INDEX: 30.42 KG/M2 | DIASTOLIC BLOOD PRESSURE: 79 MMHG | WEIGHT: 224.63 LBS | HEART RATE: 69 BPM | SYSTOLIC BLOOD PRESSURE: 123 MMHG | HEIGHT: 72 IN

## 2022-01-28 DIAGNOSIS — R03.0 PRE-HYPERTENSION: Chronic | ICD-10-CM

## 2022-01-28 DIAGNOSIS — E78.00 HYPERCHOLESTEROLEMIA: Chronic | ICD-10-CM

## 2022-01-28 DIAGNOSIS — E66.9 OBESITY, CLASS I, BMI 30-34.9: Chronic | ICD-10-CM

## 2022-01-28 DIAGNOSIS — I70.90 ATHEROSCLEROSIS OF ARTERIES: Primary | Chronic | ICD-10-CM

## 2022-01-28 DIAGNOSIS — Z82.49 FAMILY HISTORY OF PREMATURE CAD: Chronic | ICD-10-CM

## 2022-01-28 PROCEDURE — 3074F PR MOST RECENT SYSTOLIC BLOOD PRESSURE < 130 MM HG: ICD-10-PCS | Mod: CPTII,S$GLB,, | Performed by: INTERNAL MEDICINE

## 2022-01-28 PROCEDURE — 99214 OFFICE O/P EST MOD 30 MIN: CPT | Mod: S$GLB,,, | Performed by: INTERNAL MEDICINE

## 2022-01-28 PROCEDURE — 3008F BODY MASS INDEX DOCD: CPT | Mod: CPTII,S$GLB,, | Performed by: INTERNAL MEDICINE

## 2022-01-28 PROCEDURE — 3074F SYST BP LT 130 MM HG: CPT | Mod: CPTII,S$GLB,, | Performed by: INTERNAL MEDICINE

## 2022-01-28 PROCEDURE — 99999 PR PBB SHADOW E&M-EST. PATIENT-LVL III: CPT | Mod: PBBFAC,,, | Performed by: INTERNAL MEDICINE

## 2022-01-28 PROCEDURE — 3078F DIAST BP <80 MM HG: CPT | Mod: CPTII,S$GLB,, | Performed by: INTERNAL MEDICINE

## 2022-01-28 PROCEDURE — 99999 PR PBB SHADOW E&M-EST. PATIENT-LVL III: ICD-10-PCS | Mod: PBBFAC,,, | Performed by: INTERNAL MEDICINE

## 2022-01-28 PROCEDURE — 3078F PR MOST RECENT DIASTOLIC BLOOD PRESSURE < 80 MM HG: ICD-10-PCS | Mod: CPTII,S$GLB,, | Performed by: INTERNAL MEDICINE

## 2022-01-28 PROCEDURE — 1159F MED LIST DOCD IN RCRD: CPT | Mod: CPTII,S$GLB,, | Performed by: INTERNAL MEDICINE

## 2022-01-28 PROCEDURE — 99214 PR OFFICE/OUTPT VISIT, EST, LEVL IV, 30-39 MIN: ICD-10-PCS | Mod: S$GLB,,, | Performed by: INTERNAL MEDICINE

## 2022-01-28 PROCEDURE — 3008F PR BODY MASS INDEX (BMI) DOCUMENTED: ICD-10-PCS | Mod: CPTII,S$GLB,, | Performed by: INTERNAL MEDICINE

## 2022-01-28 PROCEDURE — 1159F PR MEDICATION LIST DOCUMENTED IN MEDICAL RECORD: ICD-10-PCS | Mod: CPTII,S$GLB,, | Performed by: INTERNAL MEDICINE

## 2022-01-28 RX ORDER — PRAVASTATIN SODIUM 40 MG/1
40 TABLET ORAL DAILY
Qty: 90 TABLET | Refills: 1 | Status: SHIPPED | OUTPATIENT
Start: 2022-01-28 | End: 2022-07-19 | Stop reason: SDUPTHER

## 2022-01-28 RX ORDER — ESCITALOPRAM OXALATE 5 MG/1
5 TABLET ORAL DAILY
Qty: 90 TABLET | Refills: 0 | Status: SHIPPED | OUTPATIENT
Start: 2022-01-28 | End: 2022-09-03

## 2022-01-28 NOTE — PROGRESS NOTES
Subjective:    Patient ID:  Hakeem Avelar III is a 56 y.o. male who presents for Hyperlipidemia and Risk Factor Management For Atherosclerosis        HPI  DOING WELL, BUSEY, DID NOT HAVE LABS, SEE ROS    Past Medical History:   Diagnosis Date    Aortic valve disorder     Cardiac murmur     Carotid artery occlusion     MINIMAL    Coronary artery disease     MINIMAL    Hyperlipidemia      Past Surgical History:   Procedure Laterality Date    TONSILLECTOMY       Family History   Problem Relation Age of Onset    Heart disease Mother     Heart disease Father      Social History     Socioeconomic History    Marital status:    Tobacco Use    Smoking status: Never Smoker    Smokeless tobacco: Never Used   Substance and Sexual Activity    Alcohol use: No    Drug use: No    Sexual activity: Yes     Partners: Female       Review of patient's allergies indicates:  No Known Allergies    Current Outpatient Medications:     aspirin-acetaminophen-caffeine 250-250-65 mg (EXCEDRIN MIGRAINE) 250-250-65 mg per tablet, Take 2 tablets by mouth once daily., Disp: , Rfl:     loratadine (CLARITIN) 10 mg tablet, Take 1 tablet (10 mg total) by mouth daily as needed for Allergies., Disp: , Rfl:     mometasone (NASONEX) 50 mcg/actuation nasal spray, 2 sprays by Nasal route once daily., Disp: , Rfl:     EScitalopram oxalate (LEXAPRO) 5 MG Tab, Take 1 tablet (5 mg total) by mouth once daily., Disp: 90 tablet, Rfl: 0    pravastatin (PRAVACHOL) 40 MG tablet, Take 1 tablet (40 mg total) by mouth once daily., Disp: 90 tablet, Rfl: 1    Review of Systems   Constitutional: Negative for chills, diaphoresis, fever, malaise/fatigue and night sweats.   HENT: Negative for congestion and nosebleeds.    Eyes: Negative for blurred vision and visual disturbance.   Cardiovascular: Negative for chest pain, claudication, cyanosis, dyspnea on exertion, irregular heartbeat, leg swelling, near-syncope, orthopnea, palpitations, paroxysmal  nocturnal dyspnea and syncope.   Respiratory: Negative for cough, hemoptysis, shortness of breath and wheezing.    Endocrine: Negative.    Hematologic/Lymphatic: Negative for adenopathy. Does not bruise/bleed easily.   Skin: Negative for color change and rash.   Musculoskeletal: Negative for back pain (CHRONIC,MILD) and falls.   Gastrointestinal: Negative for abdominal pain, change in bowel habit, dysphagia, jaundice, melena and nausea.   Genitourinary: Negative for dysuria, flank pain and hesitancy.   Neurological: Negative for brief paralysis, dizziness, focal weakness, light-headedness, loss of balance and weakness.   Psychiatric/Behavioral: Negative for altered mental status and depression. The patient does not have insomnia.    Allergic/Immunologic: Negative.         Objective:      Vitals:    01/28/22 1125   BP: 123/79   Pulse: 69   Weight: 101.9 kg (224 lb 10.4 oz)   Height: 6' (1.829 m)   PainSc: 0-No pain     Body mass index is 30.47 kg/m².    Physical Exam  Constitutional:       Appearance: He is well-developed.   HENT:      Head: Normocephalic and atraumatic.   Eyes:      General: No scleral icterus.     Extraocular Movements: Extraocular movements intact.   Neck:      Vascular: Normal carotid pulses. No carotid bruit, hepatojugular reflux or JVD.   Cardiovascular:      Rate and Rhythm: Normal rate and regular rhythm.  No extrasystoles are present.     Pulses: No midsystolic click.           Carotid pulses are 2+ on the right side and 2+ on the left side.       Radial pulses are 2+ on the right side and 2+ on the left side.        Femoral pulses are 2+ on the right side and 2+ on the left side.       Dorsalis pedis pulses are 2+ on the right side and 2+ on the left side.        Posterior tibial pulses are 2+ on the right side and 2+ on the left side.      Heart sounds: Normal heart sounds. Heart sounds not distant. No murmur heard.  No friction rub. No gallop.    Pulmonary:      Effort: Pulmonary effort  is normal.      Breath sounds: Normal breath sounds. No decreased breath sounds or rales.   Abdominal:      Palpations: Abdomen is soft.      Tenderness: There is no abdominal tenderness.   Musculoskeletal:         General: Normal range of motion.      Cervical back: Neck supple.      Right lower leg: No edema.      Left lower leg: No edema.   Skin:     General: Skin is warm and dry.      Capillary Refill: Capillary refill takes less than 2 seconds.      Findings: No rash.   Neurological:      General: No focal deficit present.      Mental Status: He is alert and oriented to person, place, and time.      Cranial Nerves: No cranial nerve deficit.      Motor: No tremor.   Psychiatric:         Mood and Affect: Mood normal.         Speech: Speech normal.         Behavior: Behavior normal.                 ..    Chemistry        Component Value Date/Time     03/04/2020 0921    K 4.5 03/04/2020 0921     03/04/2020 0921    CO2 27 03/04/2020 0921    BUN 22 03/04/2020 0921    CREATININE 0.94 03/04/2020 0921    GLU 98 03/04/2020 0921        Component Value Date/Time    CALCIUM 9.6 03/04/2020 0921    ALKPHOS 77 03/04/2020 0921    AST 22 03/04/2020 0921    ALT 24 03/04/2020 0921    BILITOT 0.5 03/04/2020 0921    ESTGFRAFRICA 106 03/04/2020 0921    EGFRNONAA 92 03/04/2020 0921            ..  Lab Results   Component Value Date    CHOL 190 03/04/2020    CHOL 184 06/19/2019     Lab Results   Component Value Date    HDL 51 03/04/2020    HDL 50 06/19/2019     Lab Results   Component Value Date    LDLCALC 119 (H) 03/04/2020    LDLCALC 109 (H) 06/19/2019     Lab Results   Component Value Date    TRIG 95 03/04/2020    TRIG 141 06/19/2019     Lab Results   Component Value Date    CHOLHDL 3.7 03/04/2020    CHOLHDL 3.7 06/19/2019     ..  Lab Results   Component Value Date    WBC 5.3 03/04/2020    HGB 14.4 03/04/2020    HCT 44.5 03/04/2020    MCV 87.8 03/04/2020     03/04/2020       Test(s) Reviewed  I have reviewed the  following in detail:  [] Stress test   [] Angiography   [] Echocardiogram   [] Labs   [] Other:       Assessment:         ICD-10-CM ICD-9-CM   1. Atherosclerosis of arteries  I70.8 440.8   2. Hypercholesterolemia  E78.00 272.0   3. Pre-hypertension  R03.0 796.2   4. Obesity, Class I, BMI 30-34.9  E66.9 278.00   5. Family history of premature CAD  Z82.49 V17.3     Problem List Items Addressed This Visit        Cardiac/Vascular    Hypercholesterolemia    Relevant Medications    pravastatin (PRAVACHOL) 40 MG tablet    Other Relevant Orders    Comprehensive Metabolic Panel    Lipid Panel    Atherosclerosis of arteries - Primary    Relevant Orders    Comprehensive Metabolic Panel    Pre-hypertension    Relevant Orders    Comprehensive Metabolic Panel       Endocrine    Obesity, Class I, BMI 30-34.9       Other    Family history of premature CAD           Plan:     LABS TODAY,ALL CV CLINICALLY STABLE, NO ANGINA, NO HF, NO TIA, NO CLINICAL ARRHYTHMIA,CONTINUE CURRENT MEDS, EDUCATION, DIET, EXERCISE, WEIGHT LOSS, RTC IN 1 YEAR      Atherosclerosis of arteries  Comments:  NO EMBOLIZATION  Orders:  -     Comprehensive Metabolic Panel; Future; Expected date: 01/28/2022    Hypercholesterolemia  Comments:  DIET AND MEDS  Orders:  -     Comprehensive Metabolic Panel; Future; Expected date: 01/28/2022  -     Lipid Panel; Future; Expected date: 01/28/2022  -     pravastatin (PRAVACHOL) 40 MG tablet; Take 1 tablet (40 mg total) by mouth once daily.  Dispense: 90 tablet; Refill: 1    Pre-hypertension  -     Comprehensive Metabolic Panel; Future; Expected date: 01/28/2022    Obesity, Class I, BMI 30-34.9  Comments:  WEIGHT LOSS    Family history of premature CAD    Other orders  -     EScitalopram oxalate (LEXAPRO) 5 MG Tab; Take 1 tablet (5 mg total) by mouth once daily.  Dispense: 90 tablet; Refill: 0    RTC Low level/low impact aerobic exercise 5x's/wk. Heart healthy diet and risk factor modification.    See labs and med  orders.    Aerobic exercise 5x's/wk. Heart healthy diet and risk factor modification.    See labs and med orders.

## 2022-07-13 ENCOUNTER — LAB VISIT (OUTPATIENT)
Dept: LAB | Facility: HOSPITAL | Age: 57
End: 2022-07-13
Attending: INTERNAL MEDICINE
Payer: COMMERCIAL

## 2022-07-13 DIAGNOSIS — R03.0 PRE-HYPERTENSION: ICD-10-CM

## 2022-07-13 DIAGNOSIS — I70.90 ATHEROSCLEROSIS OF ARTERIES: ICD-10-CM

## 2022-07-13 DIAGNOSIS — E78.00 HYPERCHOLESTEROLEMIA: ICD-10-CM

## 2022-07-13 LAB
ALBUMIN SERPL BCP-MCNC: 4 G/DL (ref 3.5–5.2)
ALP SERPL-CCNC: 75 U/L (ref 55–135)
ALT SERPL W/O P-5'-P-CCNC: 25 U/L (ref 10–44)
ANION GAP SERPL CALC-SCNC: 9 MMOL/L (ref 8–16)
AST SERPL-CCNC: 24 U/L (ref 10–40)
BILIRUB SERPL-MCNC: 0.7 MG/DL (ref 0.1–1)
BUN SERPL-MCNC: 23 MG/DL (ref 6–20)
CALCIUM SERPL-MCNC: 9.2 MG/DL (ref 8.7–10.5)
CHLORIDE SERPL-SCNC: 104 MMOL/L (ref 95–110)
CHOLEST SERPL-MCNC: 167 MG/DL (ref 120–199)
CHOLEST/HDLC SERPL: 3.6 {RATIO} (ref 2–5)
CO2 SERPL-SCNC: 25 MMOL/L (ref 23–29)
CREAT SERPL-MCNC: 1 MG/DL (ref 0.5–1.4)
EST. GFR  (AFRICAN AMERICAN): >60 ML/MIN/1.73 M^2
EST. GFR  (NON AFRICAN AMERICAN): >60 ML/MIN/1.73 M^2
GLUCOSE SERPL-MCNC: 106 MG/DL (ref 70–110)
HDLC SERPL-MCNC: 46 MG/DL (ref 40–75)
HDLC SERPL: 27.5 % (ref 20–50)
LDLC SERPL CALC-MCNC: 107 MG/DL (ref 63–159)
NONHDLC SERPL-MCNC: 121 MG/DL
POTASSIUM SERPL-SCNC: 4.1 MMOL/L (ref 3.5–5.1)
PROT SERPL-MCNC: 7.6 G/DL (ref 6–8.4)
SODIUM SERPL-SCNC: 138 MMOL/L (ref 136–145)
TRIGL SERPL-MCNC: 70 MG/DL (ref 30–150)

## 2022-07-13 PROCEDURE — 80061 LIPID PANEL: CPT | Performed by: INTERNAL MEDICINE

## 2022-07-13 PROCEDURE — 36415 COLL VENOUS BLD VENIPUNCTURE: CPT | Mod: PO | Performed by: INTERNAL MEDICINE

## 2022-07-13 PROCEDURE — 80053 COMPREHEN METABOLIC PANEL: CPT | Performed by: INTERNAL MEDICINE

## 2022-07-19 ENCOUNTER — OFFICE VISIT (OUTPATIENT)
Dept: CARDIOLOGY | Facility: CLINIC | Age: 57
End: 2022-07-19
Payer: COMMERCIAL

## 2022-07-19 VITALS
WEIGHT: 222.88 LBS | HEIGHT: 72 IN | SYSTOLIC BLOOD PRESSURE: 114 MMHG | HEART RATE: 79 BPM | DIASTOLIC BLOOD PRESSURE: 78 MMHG | BODY MASS INDEX: 30.19 KG/M2

## 2022-07-19 DIAGNOSIS — R03.0 PRE-HYPERTENSION: ICD-10-CM

## 2022-07-19 DIAGNOSIS — Z82.49 FAMILY HISTORY OF PREMATURE CAD: ICD-10-CM

## 2022-07-19 DIAGNOSIS — E66.9 OBESITY, CLASS I, BMI 30-34.9: Chronic | ICD-10-CM

## 2022-07-19 DIAGNOSIS — I70.90 ATHEROSCLEROSIS OF ARTERIES: Chronic | ICD-10-CM

## 2022-07-19 DIAGNOSIS — E78.00 HYPERCHOLESTEROLEMIA: Primary | Chronic | ICD-10-CM

## 2022-07-19 PROCEDURE — 1159F MED LIST DOCD IN RCRD: CPT | Mod: CPTII,S$GLB,, | Performed by: INTERNAL MEDICINE

## 2022-07-19 PROCEDURE — 3074F PR MOST RECENT SYSTOLIC BLOOD PRESSURE < 130 MM HG: ICD-10-PCS | Mod: CPTII,S$GLB,, | Performed by: INTERNAL MEDICINE

## 2022-07-19 PROCEDURE — 3008F PR BODY MASS INDEX (BMI) DOCUMENTED: ICD-10-PCS | Mod: CPTII,S$GLB,, | Performed by: INTERNAL MEDICINE

## 2022-07-19 PROCEDURE — 99214 OFFICE O/P EST MOD 30 MIN: CPT | Mod: S$GLB,,, | Performed by: INTERNAL MEDICINE

## 2022-07-19 PROCEDURE — 3078F DIAST BP <80 MM HG: CPT | Mod: CPTII,S$GLB,, | Performed by: INTERNAL MEDICINE

## 2022-07-19 PROCEDURE — 3074F SYST BP LT 130 MM HG: CPT | Mod: CPTII,S$GLB,, | Performed by: INTERNAL MEDICINE

## 2022-07-19 PROCEDURE — 99999 PR PBB SHADOW E&M-EST. PATIENT-LVL III: CPT | Mod: PBBFAC,,, | Performed by: INTERNAL MEDICINE

## 2022-07-19 PROCEDURE — 99214 PR OFFICE/OUTPT VISIT, EST, LEVL IV, 30-39 MIN: ICD-10-PCS | Mod: S$GLB,,, | Performed by: INTERNAL MEDICINE

## 2022-07-19 PROCEDURE — 1159F PR MEDICATION LIST DOCUMENTED IN MEDICAL RECORD: ICD-10-PCS | Mod: CPTII,S$GLB,, | Performed by: INTERNAL MEDICINE

## 2022-07-19 PROCEDURE — 3008F BODY MASS INDEX DOCD: CPT | Mod: CPTII,S$GLB,, | Performed by: INTERNAL MEDICINE

## 2022-07-19 PROCEDURE — 99999 PR PBB SHADOW E&M-EST. PATIENT-LVL III: ICD-10-PCS | Mod: PBBFAC,,, | Performed by: INTERNAL MEDICINE

## 2022-07-19 PROCEDURE — 3078F PR MOST RECENT DIASTOLIC BLOOD PRESSURE < 80 MM HG: ICD-10-PCS | Mod: CPTII,S$GLB,, | Performed by: INTERNAL MEDICINE

## 2022-07-19 RX ORDER — PRAVASTATIN SODIUM 40 MG/1
40 TABLET ORAL DAILY
Qty: 90 TABLET | Refills: 1 | Status: SHIPPED | OUTPATIENT
Start: 2022-07-19 | End: 2023-03-13

## 2022-07-19 NOTE — PROGRESS NOTES
Subjective:    Patient ID:  Hakeem Avelar III is a 56 y.o. male who presents for Hyperlipidemia        HPI    DISCUSSED LABS AND GOALS CMP OK  HDL 46, TG 70, DOING WELL, SEE ROS  Past Medical History:   Diagnosis Date    Aortic valve disorder     Cardiac murmur     Carotid artery occlusion     MINIMAL    Coronary artery disease     MINIMAL    Hyperlipidemia      Past Surgical History:   Procedure Laterality Date    TONSILLECTOMY       Family History   Problem Relation Age of Onset    Heart disease Mother     Heart disease Father      Social History     Socioeconomic History    Marital status:    Tobacco Use    Smoking status: Never Smoker    Smokeless tobacco: Never Used   Substance and Sexual Activity    Alcohol use: No    Drug use: No    Sexual activity: Yes     Partners: Female       Review of patient's allergies indicates:  No Known Allergies    Current Outpatient Medications:     aspirin-acetaminophen-caffeine 250-250-65 mg (EXCEDRIN MIGRAINE) 250-250-65 mg per tablet, Take 2 tablets by mouth once daily., Disp: , Rfl:     EScitalopram oxalate (LEXAPRO) 5 MG Tab, Take 1 tablet (5 mg total) by mouth once daily., Disp: 90 tablet, Rfl: 0    loratadine (CLARITIN) 10 mg tablet, Take 1 tablet (10 mg total) by mouth daily as needed for Allergies., Disp: , Rfl:     mometasone (NASONEX) 50 mcg/actuation nasal spray, 2 sprays by Nasal route once daily., Disp: , Rfl:     pravastatin (PRAVACHOL) 40 MG tablet, Take 1 tablet (40 mg total) by mouth once daily., Disp: 90 tablet, Rfl: 1    Review of Systems   Constitutional: Negative for chills, diaphoresis, fever, malaise/fatigue and night sweats.   HENT: Negative for congestion and nosebleeds.    Eyes: Negative.    Cardiovascular: Negative for chest pain, claudication, cyanosis, dyspnea on exertion, irregular heartbeat, leg swelling, near-syncope, orthopnea, palpitations, paroxysmal nocturnal dyspnea and syncope.   Respiratory: Negative for  cough, hemoptysis, shortness of breath and wheezing.    Endocrine: Negative.    Hematologic/Lymphatic: Negative for adenopathy. Does not bruise/bleed easily.   Skin: Negative for color change and rash.   Musculoskeletal: Negative for back pain (CHRONIC) and falls.   Gastrointestinal: Negative for abdominal pain, change in bowel habit, dysphagia, jaundice, melena and nausea.   Genitourinary: Negative for dysuria and flank pain.   Neurological: Negative for brief paralysis, focal weakness, headaches, light-headedness, loss of balance and weakness.   Psychiatric/Behavioral: Negative for altered mental status and depression.   Allergic/Immunologic: Negative for hives and persistent infections.        Objective:      Vitals:    07/19/22 1614   BP: 114/78   Pulse: 79   Weight: 101.1 kg (222 lb 14.2 oz)   Height: 6' (1.829 m)   PainSc: 0-No pain     Body mass index is 30.23 kg/m².    Physical Exam  Constitutional:       Appearance: He is well-developed. He is obese.   HENT:      Head: Normocephalic and atraumatic.   Eyes:      Extraocular Movements: Extraocular movements intact.      Conjunctiva/sclera: Conjunctivae normal.      Pupils: Pupils are equal, round, and reactive to light.   Neck:      Vascular: Normal carotid pulses. No carotid bruit, hepatojugular reflux or JVD.   Cardiovascular:      Rate and Rhythm: Normal rate and regular rhythm.  No extrasystoles are present.     Pulses:           Carotid pulses are 2+ on the right side and 2+ on the left side.       Radial pulses are 2+ on the right side and 2+ on the left side.        Femoral pulses are 2+ on the right side and 2+ on the left side.       Posterior tibial pulses are 2+ on the right side and 2+ on the left side.      Heart sounds: Normal heart sounds. No midsystolic click.     No friction rub. No gallop.   Pulmonary:      Effort: Pulmonary effort is normal. No respiratory distress.      Breath sounds: Normal breath sounds. No rales.   Abdominal:       Palpations: Abdomen is soft.      Tenderness: There is no abdominal tenderness.   Musculoskeletal:         General: Normal range of motion.      Cervical back: Neck supple.      Right lower leg: No edema.      Left lower leg: No edema.   Skin:     General: Skin is warm and dry.      Capillary Refill: Capillary refill takes less than 2 seconds.   Neurological:      General: No focal deficit present.      Mental Status: He is alert and oriented to person, place, and time.      Cranial Nerves: No cranial nerve deficit.      Motor: No tremor.   Psychiatric:         Mood and Affect: Mood normal.         Speech: Speech normal.         Behavior: Behavior normal.                 ..    Chemistry        Component Value Date/Time     07/13/2022 0721    K 4.1 07/13/2022 0721     07/13/2022 0721    CO2 25 07/13/2022 0721    BUN 23 (H) 07/13/2022 0721    CREATININE 1.0 07/13/2022 0721     07/13/2022 0721        Component Value Date/Time    CALCIUM 9.2 07/13/2022 0721    ALKPHOS 75 07/13/2022 0721    AST 24 07/13/2022 0721    ALT 25 07/13/2022 0721    BILITOT 0.7 07/13/2022 0721    ESTGFRAFRICA >60.0 07/13/2022 0721    EGFRNONAA >60.0 07/13/2022 0721            ..  Lab Results   Component Value Date    CHOL 167 07/13/2022    CHOL 190 03/04/2020    CHOL 184 06/19/2019     Lab Results   Component Value Date    HDL 46 07/13/2022    HDL 51 03/04/2020    HDL 50 06/19/2019     Lab Results   Component Value Date    LDLCALC 107.0 07/13/2022    LDLCALC 119 (H) 03/04/2020    LDLCALC 109 (H) 06/19/2019     Lab Results   Component Value Date    TRIG 70 07/13/2022    TRIG 95 03/04/2020    TRIG 141 06/19/2019     Lab Results   Component Value Date    CHOLHDL 27.5 07/13/2022    CHOLHDL 3.7 03/04/2020    CHOLHDL 3.7 06/19/2019     ..  Lab Results   Component Value Date    WBC 5.3 03/04/2020    HGB 14.4 03/04/2020    HCT 44.5 03/04/2020    MCV 87.8 03/04/2020     03/04/2020       Test(s) Reviewed  I have reviewed the  following in detail:  [] Stress test   [] Angiography   [] Echocardiogram   [x] Labs   [] Other:       Assessment:         ICD-10-CM ICD-9-CM   1. Hypercholesterolemia  E78.00 272.0   2. Atherosclerosis of arteries  I70.8 440.8   3. Pre-hypertension  R03.0 796.2   4. Family history of premature CAD  Z82.49 V17.3   5. Obesity, Class I, BMI 30-34.9  E66.9 278.00     Problem List Items Addressed This Visit        Cardiac/Vascular    Hypercholesterolemia - Primary    Relevant Medications    pravastatin (PRAVACHOL) 40 MG tablet    Atherosclerosis of arteries    Pre-hypertension    Family history of premature CAD           Plan:     ALL CV CLINICALLY STABLE, NO ANGINA, NO HF, NO TIA, NO CLINICAL ARRHYTHMIA,CONTINUE CURRENT MEDS, EDUCATION, DIET, EXERCISE, WEIGHT LOSS, RTC IN 6 MO       Hypercholesterolemia    Atherosclerosis of arteries    Pre-hypertension    Family history of premature CAD    Obesity, Class I, BMI 30-34.9  Comments:  DIET   Orders:  -     pravastatin (PRAVACHOL) 40 MG tablet; Take 1 tablet (40 mg total) by mouth once daily.  Dispense: 90 tablet; Refill: 1    RTC Low level/low impact aerobic exercise 5x's/wk. Heart healthy diet and risk factor modification.    See labs and med orders.    Aerobic exercise 5x's/wk. Heart healthy diet and risk factor modification.    See labs and med orders.

## 2022-11-09 ENCOUNTER — PATIENT MESSAGE (OUTPATIENT)
Dept: CARDIOLOGY | Facility: CLINIC | Age: 57
End: 2022-11-09
Payer: COMMERCIAL

## 2023-01-20 ENCOUNTER — OFFICE VISIT (OUTPATIENT)
Dept: CARDIOLOGY | Facility: CLINIC | Age: 58
End: 2023-01-20
Payer: COMMERCIAL

## 2023-01-20 VITALS
BODY MASS INDEX: 31.14 KG/M2 | HEART RATE: 60 BPM | HEIGHT: 71 IN | DIASTOLIC BLOOD PRESSURE: 85 MMHG | WEIGHT: 222.44 LBS | SYSTOLIC BLOOD PRESSURE: 131 MMHG

## 2023-01-20 DIAGNOSIS — E78.00 HYPERCHOLESTEROLEMIA: Chronic | ICD-10-CM

## 2023-01-20 DIAGNOSIS — E66.9 OBESITY, CLASS I, BMI 30-34.9: Chronic | ICD-10-CM

## 2023-01-20 DIAGNOSIS — I70.90 ATHEROSCLEROSIS OF ARTERIES: Primary | Chronic | ICD-10-CM

## 2023-01-20 DIAGNOSIS — R73.01 ELEVATED FASTING BLOOD SUGAR: Chronic | ICD-10-CM

## 2023-01-20 DIAGNOSIS — R03.0 PRE-HYPERTENSION: Chronic | ICD-10-CM

## 2023-01-20 DIAGNOSIS — Z82.49 FAMILY HISTORY OF PREMATURE CAD: ICD-10-CM

## 2023-01-20 PROCEDURE — 3079F DIAST BP 80-89 MM HG: CPT | Mod: CPTII,S$GLB,, | Performed by: INTERNAL MEDICINE

## 2023-01-20 PROCEDURE — 3008F BODY MASS INDEX DOCD: CPT | Mod: CPTII,S$GLB,, | Performed by: INTERNAL MEDICINE

## 2023-01-20 PROCEDURE — 3075F SYST BP GE 130 - 139MM HG: CPT | Mod: CPTII,S$GLB,, | Performed by: INTERNAL MEDICINE

## 2023-01-20 PROCEDURE — 3079F PR MOST RECENT DIASTOLIC BLOOD PRESSURE 80-89 MM HG: ICD-10-PCS | Mod: CPTII,S$GLB,, | Performed by: INTERNAL MEDICINE

## 2023-01-20 PROCEDURE — 99214 PR OFFICE/OUTPT VISIT, EST, LEVL IV, 30-39 MIN: ICD-10-PCS | Mod: S$GLB,,, | Performed by: INTERNAL MEDICINE

## 2023-01-20 PROCEDURE — 3075F PR MOST RECENT SYSTOLIC BLOOD PRESS GE 130-139MM HG: ICD-10-PCS | Mod: CPTII,S$GLB,, | Performed by: INTERNAL MEDICINE

## 2023-01-20 PROCEDURE — 99999 PR PBB SHADOW E&M-EST. PATIENT-LVL III: ICD-10-PCS | Mod: PBBFAC,,, | Performed by: INTERNAL MEDICINE

## 2023-01-20 PROCEDURE — 99999 PR PBB SHADOW E&M-EST. PATIENT-LVL III: CPT | Mod: PBBFAC,,, | Performed by: INTERNAL MEDICINE

## 2023-01-20 PROCEDURE — 99214 OFFICE O/P EST MOD 30 MIN: CPT | Mod: S$GLB,,, | Performed by: INTERNAL MEDICINE

## 2023-01-20 PROCEDURE — 3008F PR BODY MASS INDEX (BMI) DOCUMENTED: ICD-10-PCS | Mod: CPTII,S$GLB,, | Performed by: INTERNAL MEDICINE

## 2023-01-20 NOTE — PROGRESS NOTES
Subjective:    Patient ID:  Hakeem Avelar III is a 57 y.o. male who presents for Hyperlipidemia (1YR F/U) and Risk Factor Management For Atherosclerosis        HPI  DISCUSSED LABS CMP OK, , HDL 45. TG 70, DOING WELL, BUSY, NO CHEST PAIN NO SHORTNESS OF BREATH NO TIA TYPE SYMPTOMS NO NEAR-SYNCOPE, SEE ROS    Past Medical History:   Diagnosis Date    Aortic valve disorder     Cardiac murmur     Carotid artery occlusion     MINIMAL    Coronary artery disease     MINIMAL    Hyperlipidemia      Past Surgical History:   Procedure Laterality Date    TONSILLECTOMY       Family History   Problem Relation Age of Onset    Heart disease Mother     Heart disease Father      Social History     Socioeconomic History    Marital status:    Tobacco Use    Smoking status: Never    Smokeless tobacco: Never   Substance and Sexual Activity    Alcohol use: No    Drug use: No    Sexual activity: Yes     Partners: Female       Review of patient's allergies indicates:  No Known Allergies    Current Outpatient Medications:     aspirin-acetaminophen-caffeine 250-250-65 mg (EXCEDRIN MIGRAINE) 250-250-65 mg per tablet, Take 2 tablets by mouth once daily., Disp: , Rfl:     EScitalopram oxalate (LEXAPRO) 5 MG Tab, TAKE 1 TABLET BY MOUTH EVERY DAY, Disp: 90 tablet, Rfl: 0    loratadine (CLARITIN) 10 mg tablet, Take 1 tablet (10 mg total) by mouth daily as needed for Allergies., Disp: , Rfl:     mometasone (NASONEX) 50 mcg/actuation nasal spray, 2 sprays by Nasal route once daily., Disp: , Rfl:     pravastatin (PRAVACHOL) 40 MG tablet, Take 1 tablet (40 mg total) by mouth once daily., Disp: 90 tablet, Rfl: 1    Review of Systems   Constitutional: Negative for chills, decreased appetite, diaphoresis, fever, malaise/fatigue and night sweats.   HENT:  Negative for congestion and nosebleeds.    Eyes:  Negative for blurred vision and visual disturbance.   Cardiovascular:  Negative for chest pain, claudication, cyanosis, dyspnea on  "exertion, irregular heartbeat, leg swelling, near-syncope, orthopnea, palpitations, paroxysmal nocturnal dyspnea and syncope.   Respiratory:  Negative for cough, hemoptysis, shortness of breath and wheezing.    Endocrine: Negative.    Hematologic/Lymphatic: Negative for adenopathy. Does not bruise/bleed easily.   Skin:  Negative for color change and rash.   Musculoskeletal:  Negative for back pain (CHRONIC) and falls.   Gastrointestinal:  Negative for abdominal pain, dysphagia, jaundice, melena and nausea.   Genitourinary:  Negative for dysuria and flank pain.   Neurological:  Negative for brief paralysis, focal weakness, headaches, light-headedness, loss of balance, paresthesias and weakness.   Psychiatric/Behavioral:  Negative for altered mental status and depression.    Allergic/Immunologic: Negative for hives and persistent infections.      Objective:      Vitals:    01/20/23 1005   BP: 131/85   Pulse: 60   Weight: 100.9 kg (222 lb 7.1 oz)   Height: 5' 11" (1.803 m)   PainSc:   4   PainLoc: Back     Body mass index is 31.02 kg/m².    Physical Exam  Constitutional:       Appearance: Normal appearance. He is well-developed and overweight.   HENT:      Head: Normocephalic and atraumatic.   Eyes:      Extraocular Movements: Extraocular movements intact.      Conjunctiva/sclera: Conjunctivae normal.      Pupils: Pupils are equal, round, and reactive to light.   Neck:      Vascular: Normal carotid pulses. No carotid bruit, hepatojugular reflux or JVD.   Cardiovascular:      Rate and Rhythm: Normal rate and regular rhythm. No extrasystoles are present.     Pulses:           Carotid pulses are 2+ on the right side and 2+ on the left side.       Radial pulses are 2+ on the right side and 2+ on the left side.        Femoral pulses are 2+ on the right side and 2+ on the left side.       Posterior tibial pulses are 2+ on the right side and 2+ on the left side.      Heart sounds: Normal heart sounds. No midsystolic click.    "  No friction rub. No gallop.   Pulmonary:      Effort: Pulmonary effort is normal.      Breath sounds: Normal breath sounds and air entry. No rales.   Abdominal:      Palpations: Abdomen is soft.      Tenderness: There is no abdominal tenderness.   Musculoskeletal:         General: Normal range of motion.      Cervical back: Neck supple.      Right lower leg: No edema.      Left lower leg: No edema.   Skin:     General: Skin is warm and dry.      Capillary Refill: Capillary refill takes less than 2 seconds.   Neurological:      General: No focal deficit present.      Mental Status: He is alert and oriented to person, place, and time.      Cranial Nerves: Cranial nerves 2-12 are intact. No cranial nerve deficit.      Motor: No tremor.   Psychiatric:         Mood and Affect: Mood normal.         Speech: Speech normal.         Behavior: Behavior normal.               ..    Chemistry        Component Value Date/Time     07/13/2022 0721    K 4.1 07/13/2022 0721     07/13/2022 0721    CO2 25 07/13/2022 0721    BUN 23 (H) 07/13/2022 0721    CREATININE 1.0 07/13/2022 0721     07/13/2022 0721        Component Value Date/Time    CALCIUM 9.2 07/13/2022 0721    ALKPHOS 75 07/13/2022 0721    AST 24 07/13/2022 0721    ALT 25 07/13/2022 0721    BILITOT 0.7 07/13/2022 0721    ESTGFRAFRICA >60.0 07/13/2022 0721    EGFRNONAA >60.0 07/13/2022 0721            ..  Lab Results   Component Value Date    CHOL 167 07/13/2022    CHOL 190 03/04/2020    CHOL 184 06/19/2019     Lab Results   Component Value Date    HDL 46 07/13/2022    HDL 51 03/04/2020    HDL 50 06/19/2019     Lab Results   Component Value Date    LDLCALC 107.0 07/13/2022    LDLCALC 119 (H) 03/04/2020    LDLCALC 109 (H) 06/19/2019     Lab Results   Component Value Date    TRIG 70 07/13/2022    TRIG 95 03/04/2020    TRIG 141 06/19/2019     Lab Results   Component Value Date    CHOLHDL 27.5 07/13/2022    CHOLHDL 3.7 03/04/2020    CHOLHDL 3.7 06/19/2019      ..  Lab Results   Component Value Date    WBC 5.3 03/04/2020    HGB 14.4 03/04/2020    HCT 44.5 03/04/2020    MCV 87.8 03/04/2020     03/04/2020       Test(s) Reviewed  I have reviewed the following in detail:  [] Stress test   [] Angiography   [] Echocardiogram   [x] Labs   [] Other:       Assessment:         ICD-10-CM ICD-9-CM   1. Atherosclerosis of arteries  I70.90 440.8   2. Hypercholesterolemia  E78.00 272.0   3. Pre-hypertension  R03.0 796.2   4. Family history of premature CAD  Z82.49 V17.3   5. Obesity, Class I, BMI 30-34.9  E66.9 278.00   6. Elevated fasting blood sugar  R73.01 790.21     Problem List Items Addressed This Visit          Cardiac/Vascular    Hypercholesterolemia    Relevant Orders    Comprehensive Metabolic Panel    Atherosclerosis of arteries - Primary    Relevant Orders    Comprehensive Metabolic Panel    Pre-hypertension    Family history of premature CAD       Endocrine    Elevated fasting blood sugar    Relevant Orders    Hemoglobin A1C    Obesity, Class I, BMI 30-34.9        Plan:     ALL CV CLINICALLY STABLE, NO ANGINA, NO HF, NO TIA, NO CLINICAL ARRHYTHMIA,CONTINUE CURRENT MEDS, EDUCATION, DIET, EXERCISE , VERY WELL CONTROLLED RISK FACTORS, DOING WELL FROM CARDIOVASCULAR VIEW LOW RISK, RETURN TO CLINIC IN 6-7 MO WITH LABS      Atherosclerosis of arteries  Comments:  MINOR  Orders:  -     Comprehensive Metabolic Panel; Future; Expected date: 07/20/2023    Hypercholesterolemia  Comments:  GOOD ON MEDS  Orders:  -     Comprehensive Metabolic Panel; Future; Expected date: 07/20/2023    Pre-hypertension    Family history of premature CAD    Obesity, Class I, BMI 30-34.9    Elevated fasting blood sugar  -     Hemoglobin A1C; Future; Expected date: 07/20/2023    RTC Low level/low impact aerobic exercise 5x's/wk. Heart healthy diet and risk factor modification.    See labs and med orders.    Aerobic exercise 5x's/wk. Heart healthy diet and risk factor modification.    See labs and  med orders.

## 2023-01-30 ENCOUNTER — PATIENT MESSAGE (OUTPATIENT)
Dept: CARDIOLOGY | Facility: CLINIC | Age: 58
End: 2023-01-30
Payer: COMMERCIAL

## 2023-02-01 ENCOUNTER — PATIENT MESSAGE (OUTPATIENT)
Dept: CARDIOLOGY | Facility: CLINIC | Age: 58
End: 2023-02-01
Payer: COMMERCIAL

## 2023-03-07 ENCOUNTER — PATIENT MESSAGE (OUTPATIENT)
Dept: CARDIOLOGY | Facility: CLINIC | Age: 58
End: 2023-03-07
Payer: COMMERCIAL

## 2023-08-31 RX ORDER — ESCITALOPRAM OXALATE 5 MG/1
TABLET ORAL
Qty: 90 TABLET | Refills: 0 | Status: SHIPPED | OUTPATIENT
Start: 2023-08-31 | End: 2024-01-02

## 2023-10-21 DIAGNOSIS — E66.9 OBESITY, CLASS I, BMI 30-34.9: Chronic | ICD-10-CM

## 2023-10-23 RX ORDER — PRAVASTATIN SODIUM 40 MG/1
TABLET ORAL
Qty: 90 TABLET | Refills: 0 | Status: SHIPPED | OUTPATIENT
Start: 2023-10-23 | End: 2024-02-26

## 2023-12-30 DIAGNOSIS — I70.90 ATHEROSCLEROSIS OF ARTERIES: Primary | ICD-10-CM

## 2024-01-02 RX ORDER — ESCITALOPRAM OXALATE 5 MG/1
TABLET ORAL
Qty: 90 TABLET | Refills: 0 | Status: SHIPPED | OUTPATIENT
Start: 2024-01-02

## 2024-02-26 DIAGNOSIS — E66.9 OBESITY, CLASS I, BMI 30-34.9: Chronic | ICD-10-CM

## 2024-02-26 RX ORDER — PRAVASTATIN SODIUM 40 MG/1
TABLET ORAL
Qty: 90 TABLET | Refills: 0 | Status: SHIPPED | OUTPATIENT
Start: 2024-02-26

## 2024-07-01 DIAGNOSIS — I70.90 ATHEROSCLEROSIS OF ARTERIES: ICD-10-CM

## 2024-07-01 RX ORDER — ESCITALOPRAM OXALATE 5 MG/1
TABLET ORAL
Qty: 90 TABLET | Refills: 0 | Status: SHIPPED | OUTPATIENT
Start: 2024-07-01

## 2024-07-04 DIAGNOSIS — E66.9 OBESITY, CLASS I, BMI 30-34.9: Chronic | ICD-10-CM

## 2024-07-05 RX ORDER — PRAVASTATIN SODIUM 40 MG/1
TABLET ORAL
Qty: 90 TABLET | Refills: 0 | Status: SHIPPED | OUTPATIENT
Start: 2024-07-05

## 2024-10-06 DIAGNOSIS — I70.90 ATHEROSCLEROSIS OF ARTERIES: ICD-10-CM

## 2024-10-07 DIAGNOSIS — E78.00 HYPERCHOLESTEROLEMIA: Primary | ICD-10-CM

## 2024-10-07 DIAGNOSIS — Z82.49 FAMILY HISTORY OF PREMATURE CAD: ICD-10-CM

## 2024-10-07 RX ORDER — ESCITALOPRAM OXALATE 5 MG/1
TABLET ORAL
Qty: 90 TABLET | Refills: 1 | Status: SHIPPED | OUTPATIENT
Start: 2024-10-07

## 2024-10-21 ENCOUNTER — LAB VISIT (OUTPATIENT)
Dept: LAB | Facility: HOSPITAL | Age: 59
End: 2024-10-21
Attending: INTERNAL MEDICINE
Payer: COMMERCIAL

## 2024-10-21 DIAGNOSIS — E78.00 HYPERCHOLESTEROLEMIA: ICD-10-CM

## 2024-10-21 DIAGNOSIS — Z82.49 FAMILY HISTORY OF PREMATURE CAD: ICD-10-CM

## 2024-10-21 LAB
ALBUMIN SERPL BCP-MCNC: 4 G/DL (ref 3.5–5.2)
ALP SERPL-CCNC: 96 U/L (ref 40–150)
ALT SERPL W/O P-5'-P-CCNC: 26 U/L (ref 10–44)
ANION GAP SERPL CALC-SCNC: 6 MMOL/L (ref 8–16)
AST SERPL-CCNC: 25 U/L (ref 10–40)
BILIRUB SERPL-MCNC: 0.6 MG/DL (ref 0.1–1)
BUN SERPL-MCNC: 24 MG/DL (ref 6–20)
CALCIUM SERPL-MCNC: 9.6 MG/DL (ref 8.7–10.5)
CHLORIDE SERPL-SCNC: 109 MMOL/L (ref 95–110)
CHOLEST SERPL-MCNC: 174 MG/DL (ref 120–199)
CHOLEST/HDLC SERPL: 3.9 {RATIO} (ref 2–5)
CO2 SERPL-SCNC: 26 MMOL/L (ref 23–29)
CREAT SERPL-MCNC: 1 MG/DL (ref 0.5–1.4)
EST. GFR  (NO RACE VARIABLE): >60 ML/MIN/1.73 M^2
GLUCOSE SERPL-MCNC: 109 MG/DL (ref 70–110)
HDLC SERPL-MCNC: 45 MG/DL (ref 40–75)
HDLC SERPL: 25.9 % (ref 20–50)
LDLC SERPL CALC-MCNC: 113 MG/DL (ref 63–159)
NONHDLC SERPL-MCNC: 129 MG/DL
POTASSIUM SERPL-SCNC: 4.5 MMOL/L (ref 3.5–5.1)
PROT SERPL-MCNC: 8 G/DL (ref 6–8.4)
SODIUM SERPL-SCNC: 141 MMOL/L (ref 136–145)
TRIGL SERPL-MCNC: 80 MG/DL (ref 30–150)

## 2024-10-21 PROCEDURE — 36415 COLL VENOUS BLD VENIPUNCTURE: CPT | Mod: PO | Performed by: INTERNAL MEDICINE

## 2024-10-21 PROCEDURE — 80061 LIPID PANEL: CPT | Performed by: INTERNAL MEDICINE

## 2024-10-21 PROCEDURE — 80053 COMPREHEN METABOLIC PANEL: CPT | Performed by: INTERNAL MEDICINE

## 2024-10-25 ENCOUNTER — OFFICE VISIT (OUTPATIENT)
Dept: CARDIOLOGY | Facility: CLINIC | Age: 59
End: 2024-10-25
Payer: COMMERCIAL

## 2024-10-25 VITALS
WEIGHT: 222.88 LBS | HEIGHT: 71 IN | DIASTOLIC BLOOD PRESSURE: 72 MMHG | SYSTOLIC BLOOD PRESSURE: 122 MMHG | BODY MASS INDEX: 31.2 KG/M2

## 2024-10-25 DIAGNOSIS — E66.811 OBESITY, CLASS I, BMI 30-34.9: Chronic | ICD-10-CM

## 2024-10-25 DIAGNOSIS — R03.0 PRE-HYPERTENSION: Chronic | ICD-10-CM

## 2024-10-25 DIAGNOSIS — E78.00 HYPERCHOLESTEROLEMIA: Chronic | ICD-10-CM

## 2024-10-25 DIAGNOSIS — I70.90 ATHEROSCLEROSIS OF ARTERIES: Primary | Chronic | ICD-10-CM

## 2024-10-25 DIAGNOSIS — F41.9 ANXIETY: Chronic | ICD-10-CM

## 2024-10-25 PROCEDURE — 99999 PR PBB SHADOW E&M-EST. PATIENT-LVL III: CPT | Mod: PBBFAC,,, | Performed by: INTERNAL MEDICINE

## 2024-10-25 RX ORDER — PRAVASTATIN SODIUM 80 MG/1
80 TABLET ORAL NIGHTLY
Qty: 90 TABLET | Refills: 1 | Status: SHIPPED | OUTPATIENT
Start: 2024-10-25 | End: 2025-10-25

## 2024-10-25 NOTE — PROGRESS NOTES
Subjective:    Patient ID:  Hakeem Avelar III is a 58 y.o. male who presents for Risk Factor Management For Atherosclerosis        HPI  MISSED APPOINTMENTS, DISCUSSED LABS CMP OK , HDL 45, TRIGLYCERIDE 80, DOING WELL, BUSY WITH WORK, SEE ROS    Past Medical History:   Diagnosis Date    Aortic valve disorder     Cardiac murmur     Carotid artery occlusion     MINIMAL    Coronary artery disease     MINIMAL    Hyperlipidemia      Past Surgical History:   Procedure Laterality Date    TONSILLECTOMY       Family History   Problem Relation Name Age of Onset    No Known Problems Mother      No Known Problems Sister      No Known Problems Brother       Social History     Socioeconomic History    Marital status:    Tobacco Use    Smoking status: Never    Smokeless tobacco: Never   Substance and Sexual Activity    Alcohol use: No    Drug use: No    Sexual activity: Yes     Partners: Female       Review of patient's allergies indicates:  No Known Allergies    Current Outpatient Medications:     aspirin-acetaminophen-caffeine 250-250-65 mg (EXCEDRIN MIGRAINE) 250-250-65 mg per tablet, Take 2 tablets by mouth once daily., Disp: , Rfl:     EScitalopram oxalate (LEXAPRO) 5 MG Tab, TAKE 1 TABLET BY MOUTH EVERY DAY, Disp: 90 tablet, Rfl: 1    loratadine (CLARITIN) 10 mg tablet, Take 1 tablet (10 mg total) by mouth daily as needed for Allergies., Disp: , Rfl:     mometasone (NASONEX) 50 mcg/actuation nasal spray, 2 sprays by Nasal route once daily., Disp: , Rfl:     pravastatin (PRAVACHOL) 80 MG tablet, Take 1 tablet (80 mg total) by mouth every evening., Disp: 90 tablet, Rfl: 1    Review of Systems   Constitutional: Negative for chills, decreased appetite, diaphoresis, fever, malaise/fatigue and night sweats.   HENT:  Negative for congestion and nosebleeds.    Eyes:  Negative for blurred vision and visual disturbance.   Cardiovascular:  Negative for chest pain, claudication, cyanosis, dyspnea on exertion, irregular  "heartbeat, leg swelling, near-syncope, orthopnea, palpitations, paroxysmal nocturnal dyspnea and syncope.   Respiratory:  Negative for cough (ALLERGIES), hemoptysis, shortness of breath and wheezing.    Endocrine: Negative for polyphagia and polyuria.   Hematologic/Lymphatic: Negative for adenopathy. Does not bruise/bleed easily.   Skin:  Negative for color change and rash.   Musculoskeletal:  Negative for back pain (CHRONIC) and falls.   Gastrointestinal:  Negative for abdominal pain, dysphagia, jaundice, melena and nausea.   Genitourinary:  Negative for dysuria and flank pain.   Neurological:  Negative for brief paralysis, focal weakness, headaches, light-headedness, loss of balance, paresthesias and weakness.   Psychiatric/Behavioral:  Negative for altered mental status and depression.    Allergic/Immunologic: Positive for environmental allergies. Negative for hives and persistent infections.        Objective:      Vitals:    10/25/24 1133   BP: 122/72   Weight: 101.1 kg (222 lb 14.2 oz)   Height: 5' 11" (1.803 m)   PainSc: 0-No pain     Body mass index is 31.09 kg/m².    Physical Exam  Constitutional:       Appearance: Normal appearance. He is well-developed and overweight.   HENT:      Head: Normocephalic and atraumatic.   Eyes:      General: No scleral icterus.     Extraocular Movements: Extraocular movements intact.      Conjunctiva/sclera: Conjunctivae normal.      Pupils: Pupils are equal, round, and reactive to light.   Neck:      Vascular: Normal carotid pulses. No carotid bruit, hepatojugular reflux or JVD.   Cardiovascular:      Rate and Rhythm: Normal rate and regular rhythm. No extrasystoles are present.     Pulses:           Carotid pulses are 2+ on the right side and 2+ on the left side.       Radial pulses are 2+ on the right side and 2+ on the left side.        Femoral pulses are 2+ on the right side and 2+ on the left side.       Posterior tibial pulses are 2+ on the right side and 2+ on the left " side.      Heart sounds: Normal heart sounds. No midsystolic click.      No friction rub. No gallop.   Pulmonary:      Effort: Pulmonary effort is normal.      Breath sounds: Normal breath sounds and air entry. No rales.   Abdominal:      Palpations: Abdomen is soft.      Tenderness: There is no abdominal tenderness.   Musculoskeletal:         General: Normal range of motion.      Cervical back: Neck supple.      Right lower leg: No edema.      Left lower leg: No edema.   Skin:     General: Skin is warm and dry.      Capillary Refill: Capillary refill takes less than 2 seconds.   Neurological:      General: No focal deficit present.      Mental Status: He is alert and oriented to person, place, and time.      Cranial Nerves: Cranial nerves 2-12 are intact. No cranial nerve deficit.      Motor: No tremor.   Psychiatric:         Mood and Affect: Mood normal.         Speech: Speech normal.         Behavior: Behavior normal.                 ..    Chemistry        Component Value Date/Time     10/21/2024 0846    K 4.5 10/21/2024 0846     10/21/2024 0846    CO2 26 10/21/2024 0846    BUN 24 (H) 10/21/2024 0846    CREATININE 1.0 10/21/2024 0846     10/21/2024 0846        Component Value Date/Time    CALCIUM 9.6 10/21/2024 0846    ALKPHOS 96 10/21/2024 0846    AST 25 10/21/2024 0846    ALT 26 10/21/2024 0846    BILITOT 0.6 10/21/2024 0846    ESTGFRAFRICA >60.0 07/13/2022 0721    EGFRNONAA >60.0 07/13/2022 0721            ..  Lab Results   Component Value Date    CHOL 174 10/21/2024    CHOL 167 07/13/2022    CHOL 190 03/04/2020     Lab Results   Component Value Date    HDL 45 10/21/2024    HDL 46 07/13/2022    HDL 51 03/04/2020     Lab Results   Component Value Date    LDLCALC 113.0 10/21/2024    LDLCALC 107.0 07/13/2022    LDLCALC 119 (H) 03/04/2020     Lab Results   Component Value Date    TRIG 80 10/21/2024    TRIG 70 07/13/2022    TRIG 95 03/04/2020     Lab Results   Component Value Date    CHOLHDL 25.9  10/21/2024    CHOLHDL 27.5 07/13/2022    CHOLHDL 3.7 03/04/2020     ..  Lab Results   Component Value Date    WBC 5.3 03/04/2020    HGB 14.4 03/04/2020    HCT 44.5 03/04/2020    MCV 87.8 03/04/2020     03/04/2020       Test(s) Reviewed  I have reviewed the following in detail:  [] Stress test   [] Angiography   [] Echocardiogram   [x] Labs   [] Other:       Assessment:         ICD-10-CM ICD-9-CM   1. Atherosclerosis of arteries  I70.90 440.8   2. Hypercholesterolemia  E78.00 272.0   3. Anxiety  F41.9 300.00   4. Obesity, Class I, BMI 30-34.9  E66.811 278.00   5. Pre-hypertension  R03.0 796.2     Problem List Items Addressed This Visit          Psychiatric    Anxiety       Cardiac/Vascular    Hypercholesterolemia    Relevant Orders    Comprehensive Metabolic Panel    Lipid Panel    Atherosclerosis of arteries - Primary    Relevant Orders    Comprehensive Metabolic Panel    Pre-hypertension    Relevant Orders    Comprehensive Metabolic Panel       Endocrine    Obesity, Class I, BMI 30-34.9        Plan:     INCREASE PRAVACHOL TO 80, MG TARGET LDL LOWER,ALL CV CLINICALLY STABLE, NO ANGINA, NO HF, NO TIA, NO CLINICAL ARRHYTHMIA,CONTINUE CURRENT MEDS, EDUCATION, DIET, EXERCISE , WEIGHT LOSS RETURN TO CLINIC IN 6 MO WITH LABS      Atherosclerosis of arteries  -     Comprehensive Metabolic Panel; Future; Expected date: 04/25/2025    Hypercholesterolemia  -     Comprehensive Metabolic Panel; Future; Expected date: 04/25/2025  -     Lipid Panel; Future; Expected date: 04/25/2025    Anxiety    Obesity, Class I, BMI 30-34.9    Pre-hypertension  -     Comprehensive Metabolic Panel; Future; Expected date: 04/25/2025    Other orders  -     pravastatin (PRAVACHOL) 80 MG tablet; Take 1 tablet (80 mg total) by mouth every evening.  Dispense: 90 tablet; Refill: 1    RTC Low level/low impact aerobic exercise 5x's/wk. Heart healthy diet and risk factor modification.    See labs and med orders.    Aerobic exercise 5x's/wk. Heart  healthy diet and risk factor modification.    See labs and med orders.      ALL CV CLINICALLY STABLE, NO ANGINA, NO HF, NO TIA, NO CLINICAL ARRHYTHMIA,CONTINUE CURRENT MEDS, EDUCATION, DIET, EXERCISE

## 2025-04-15 ENCOUNTER — LAB VISIT (OUTPATIENT)
Dept: LAB | Facility: HOSPITAL | Age: 60
End: 2025-04-15
Attending: INTERNAL MEDICINE
Payer: COMMERCIAL

## 2025-04-15 DIAGNOSIS — E78.00 HYPERCHOLESTEROLEMIA: Chronic | ICD-10-CM

## 2025-04-15 DIAGNOSIS — R03.0 PRE-HYPERTENSION: Chronic | ICD-10-CM

## 2025-04-15 DIAGNOSIS — I70.90 ATHEROSCLEROSIS OF ARTERIES: Chronic | ICD-10-CM

## 2025-04-15 LAB
ALBUMIN SERPL BCP-MCNC: 3.1 G/DL (ref 3.5–5.2)
ALP SERPL-CCNC: 501 UNIT/L (ref 40–150)
ALT SERPL W/O P-5'-P-CCNC: 122 UNIT/L (ref 10–44)
ANION GAP (OHS): 7 MMOL/L (ref 8–16)
AST SERPL-CCNC: 61 UNIT/L (ref 11–45)
BILIRUB SERPL-MCNC: 0.6 MG/DL (ref 0.1–1)
BUN SERPL-MCNC: 18 MG/DL (ref 6–20)
CALCIUM SERPL-MCNC: 9.4 MG/DL (ref 8.7–10.5)
CHLORIDE SERPL-SCNC: 104 MMOL/L (ref 95–110)
CHOLEST SERPL-MCNC: 158 MG/DL (ref 120–199)
CHOLEST/HDLC SERPL: 4.3 {RATIO} (ref 2–5)
CO2 SERPL-SCNC: 26 MMOL/L (ref 23–29)
CREAT SERPL-MCNC: 0.8 MG/DL (ref 0.5–1.4)
GFR SERPLBLD CREATININE-BSD FMLA CKD-EPI: >60 ML/MIN/1.73/M2
GLUCOSE SERPL-MCNC: 97 MG/DL (ref 70–110)
HDLC SERPL-MCNC: 37 MG/DL (ref 40–75)
HDLC SERPL: 23.4 % (ref 20–50)
LDLC SERPL CALC-MCNC: 105.8 MG/DL (ref 63–159)
NONHDLC SERPL-MCNC: 121 MG/DL
POTASSIUM SERPL-SCNC: 4.6 MMOL/L (ref 3.5–5.1)
PROT SERPL-MCNC: 7.9 GM/DL (ref 6–8.4)
SODIUM SERPL-SCNC: 137 MMOL/L (ref 136–145)
TRIGL SERPL-MCNC: 76 MG/DL (ref 30–150)

## 2025-04-15 PROCEDURE — 80053 COMPREHEN METABOLIC PANEL: CPT

## 2025-04-15 PROCEDURE — 36415 COLL VENOUS BLD VENIPUNCTURE: CPT | Mod: PO

## 2025-04-15 PROCEDURE — 82465 ASSAY BLD/SERUM CHOLESTEROL: CPT

## 2025-04-22 PROBLEM — R03.0 PRE-HYPERTENSION: Status: RESOLVED | Noted: 2017-07-21 | Resolved: 2025-04-22

## 2025-04-22 PROBLEM — E66.811 OBESITY, CLASS I, BMI 30-34.9: Status: RESOLVED | Noted: 2020-02-24 | Resolved: 2025-04-22

## 2025-04-22 PROBLEM — R73.01 ELEVATED FASTING BLOOD SUGAR: Status: RESOLVED | Noted: 2018-05-21 | Resolved: 2025-04-22

## 2025-05-16 ENCOUNTER — OFFICE VISIT (OUTPATIENT)
Dept: CARDIOLOGY | Facility: CLINIC | Age: 60
End: 2025-05-16
Payer: COMMERCIAL

## 2025-05-16 VITALS
SYSTOLIC BLOOD PRESSURE: 133 MMHG | HEART RATE: 72 BPM | DIASTOLIC BLOOD PRESSURE: 83 MMHG | BODY MASS INDEX: 30.31 KG/M2 | WEIGHT: 216.5 LBS | HEIGHT: 71 IN

## 2025-05-16 DIAGNOSIS — E78.00 HYPERCHOLESTEROLEMIA: Chronic | ICD-10-CM

## 2025-05-16 DIAGNOSIS — I70.90 ATHEROSCLEROSIS OF ARTERIES: Chronic | ICD-10-CM

## 2025-05-16 DIAGNOSIS — R06.09 DOE (DYSPNEA ON EXERTION): Primary | ICD-10-CM

## 2025-05-16 DIAGNOSIS — R01.1 SYSTOLIC MURMUR: ICD-10-CM

## 2025-05-16 DIAGNOSIS — E66.811 OBESITY, CLASS I, BMI 30-34.9: Chronic | ICD-10-CM

## 2025-05-16 DIAGNOSIS — R94.31 NONSPECIFIC ABNORMAL ELECTROCARDIOGRAM (ECG) (EKG): ICD-10-CM

## 2025-05-16 DIAGNOSIS — K76.0 FATTY LIVER: Chronic | ICD-10-CM

## 2025-05-16 LAB
OHS QRS DURATION: 100 MS
OHS QTC CALCULATION: 410 MS

## 2025-05-16 PROCEDURE — 99214 OFFICE O/P EST MOD 30 MIN: CPT | Mod: 25,S$GLB,, | Performed by: INTERNAL MEDICINE

## 2025-05-16 PROCEDURE — 93005 ELECTROCARDIOGRAM TRACING: CPT | Mod: PO

## 2025-05-16 PROCEDURE — 3075F SYST BP GE 130 - 139MM HG: CPT | Mod: CPTII,S$GLB,, | Performed by: INTERNAL MEDICINE

## 2025-05-16 PROCEDURE — 99999 PR PBB SHADOW E&M-EST. PATIENT-LVL III: CPT | Mod: PBBFAC,,, | Performed by: INTERNAL MEDICINE

## 2025-05-16 PROCEDURE — 1159F MED LIST DOCD IN RCRD: CPT | Mod: CPTII,S$GLB,, | Performed by: INTERNAL MEDICINE

## 2025-05-16 PROCEDURE — 3079F DIAST BP 80-89 MM HG: CPT | Mod: CPTII,S$GLB,, | Performed by: INTERNAL MEDICINE

## 2025-05-16 PROCEDURE — 3008F BODY MASS INDEX DOCD: CPT | Mod: CPTII,S$GLB,, | Performed by: INTERNAL MEDICINE

## 2025-05-16 PROCEDURE — 93010 ELECTROCARDIOGRAM REPORT: CPT | Mod: S$GLB,,, | Performed by: INTERNAL MEDICINE

## 2025-05-16 NOTE — PROGRESS NOTES
Subjective:    Patient ID:  Hakeem Avelar III is a 59 y.o. male who presents for Atherosclerosis of arteries and Heart Problem        HPI  DISCUSSED LABS AND GOALS CMP ELEVATED LIVER ENZYMES, , HDL 37, SEEN BY PCP HEPATITIS SCREEN NEGATIVE, WAS OFF STATIN, WALL REPEAT HEPATIC PANEL BETTER, HAD AN EPISODE OF N/V AT THAT TIME,WHEN LIVER ENZYMES UP,  HAD US FATTY LIVER, DX WITH SLEEP APNEA TO GET CPAP, SOME FRANCO, MORE RECENTLY, SEE ROS    Past Medical History:   Diagnosis Date    Aortic valve disorder     Cardiac murmur     Carotid artery occlusion     MINIMAL    Coronary artery disease     MINIMAL    Hyperlipidemia     Obesity, Class I, BMI 30-34.9 02/24/2020     Past Surgical History:   Procedure Laterality Date    TONSILLECTOMY       Family History   Problem Relation Name Age of Onset    No Known Problems Mother      Heart disease Father      No Known Problems Sister      No Known Problems Brother      Diabetes Maternal Grandmother      Diabetes Paternal Grandmother      Early death Paternal Grandfather       Social History     Socioeconomic History    Marital status:    Tobacco Use    Smoking status: Never    Smokeless tobacco: Never   Substance and Sexual Activity    Alcohol use: No    Drug use: No    Sexual activity: Yes     Partners: Female     Social Drivers of Health     Financial Resource Strain: Low Risk  (4/21/2025)    Overall Financial Resource Strain (CARDIA)     Difficulty of Paying Living Expenses: Not hard at all   Food Insecurity: No Food Insecurity (4/21/2025)    Hunger Vital Sign     Worried About Running Out of Food in the Last Year: Never true     Ran Out of Food in the Last Year: Never true   Transportation Needs: No Transportation Needs (4/21/2025)    PRAPARE - Transportation     Lack of Transportation (Medical): No     Lack of Transportation (Non-Medical): No   Physical Activity: Inactive (4/21/2025)    Exercise Vital Sign     Days of Exercise per Week: 3 days     Minutes of  "Exercise per Session: 0 min   Stress: Stress Concern Present (4/21/2025)    [employer] - Occupational Stress Questionnaire     Feeling of Stress : Rather much   Housing Stability: Low Risk  (4/21/2025)    Housing Stability Vital Sign     Unable to Pay for Housing in the Last Year: No     Homeless in the Last Year: No       Review of patient's allergies indicates:  No Known Allergies  Current Medications[1]    Review of Systems   Constitutional: Negative for chills, decreased appetite, diaphoresis, fever, malaise/fatigue and night sweats.   HENT:  Negative for congestion and nosebleeds.    Eyes:  Negative for blurred vision and visual disturbance.   Cardiovascular:  Positive for dyspnea on exertion (MILD). Negative for chest pain, claudication, cyanosis, irregular heartbeat, leg swelling, near-syncope, orthopnea, palpitations, paroxysmal nocturnal dyspnea and syncope.   Respiratory:  Negative for cough, hemoptysis, shortness of breath and wheezing.    Endocrine: Negative for polyphagia and polyuria.   Hematologic/Lymphatic: Negative for adenopathy. Does not bruise/bleed easily.   Skin:  Negative for color change and rash.   Musculoskeletal:  Negative for back pain (CHRONIC) and falls.   Gastrointestinal:  Negative for abdominal pain, dysphagia, jaundice, melena and nausea (RESOLVED).   Genitourinary:  Negative for dysuria and flank pain.   Neurological:  Negative for brief paralysis, focal weakness, headaches, light-headedness, loss of balance, paresthesias and weakness.   Psychiatric/Behavioral:  Negative for altered mental status and depression.    Allergic/Immunologic: Positive for environmental allergies (STABLE). Negative for persistent infections.        Objective:      Vitals:    05/16/25 0834   BP: 133/83   Pulse: 72   Weight: 98.2 kg (216 lb 7.9 oz)   Height: 5' 11" (1.803 m)   PainSc: 0-No pain     Body mass index is 30.19 kg/m².    Physical Exam  Constitutional:       " Appearance: Normal appearance. He is well-developed and overweight.   HENT:      Head: Normocephalic and atraumatic.      Comments: SLIGHTLY CONGESTED  Eyes:      General: No scleral icterus.     Extraocular Movements: Extraocular movements intact.      Conjunctiva/sclera: Conjunctivae normal.      Pupils: Pupils are equal, round, and reactive to light.   Neck:      Vascular: Normal carotid pulses. No carotid bruit, hepatojugular reflux or JVD.   Cardiovascular:      Rate and Rhythm: Normal rate and regular rhythm. No extrasystoles are present.     Pulses:           Carotid pulses are 2+ on the right side and 2+ on the left side.       Radial pulses are 2+ on the right side and 2+ on the left side.        Femoral pulses are 2+ on the right side and 2+ on the left side.       Posterior tibial pulses are 2+ on the right side and 2+ on the left side.      Heart sounds: No midsystolic click. Murmur heard.      Systolic murmur is present with a grade of 1/6 at the upper right sternal border.      No friction rub. No gallop.   Pulmonary:      Effort: Pulmonary effort is normal. No respiratory distress.      Breath sounds: Normal breath sounds. No rales.   Abdominal:      Palpations: Abdomen is soft.      Tenderness: There is no abdominal tenderness.   Musculoskeletal:         General: Normal range of motion.      Cervical back: Neck supple.      Right lower leg: No edema.      Left lower leg: No edema.   Skin:     General: Skin is warm and dry.      Capillary Refill: Capillary refill takes less than 2 seconds.   Neurological:      General: No focal deficit present.      Mental Status: He is alert and oriented to person, place, and time.      Cranial Nerves: Cranial nerves 2-12 are intact. No cranial nerve deficit.      Motor: No tremor.   Psychiatric:         Mood and Affect: Mood normal.         Speech: Speech normal.         Behavior: Behavior normal.                 ..    Chemistry        Component Value Date/Time      04/15/2025 0802     10/21/2024 0846    K 4.6 04/15/2025 0802    K 4.5 10/21/2024 0846     04/15/2025 0802     10/21/2024 0846    CO2 26 04/15/2025 0802    CO2 26 10/21/2024 0846    BUN 18 04/15/2025 0802    CREATININE 0.8 04/15/2025 0802    GLU 97 04/15/2025 0802     10/21/2024 0846        Component Value Date/Time    CALCIUM 9.4 04/15/2025 0802    CALCIUM 9.6 10/21/2024 0846    ALKPHOS 330 (H) 04/22/2025 0932    AST 32 04/22/2025 0932    ALT 45 (H) 04/22/2025 0932    BILITOT 0.3 04/22/2025 0932    ESTGFRAFRICA >60.0 07/13/2022 0721    EGFRNONAA >60.0 07/13/2022 0721            ..  Lab Results   Component Value Date    CHOL 158 04/15/2025    CHOL 174 10/21/2024    CHOL 167 07/13/2022     Lab Results   Component Value Date    HDL 37 (L) 04/15/2025    HDL 45 10/21/2024    HDL 46 07/13/2022     Lab Results   Component Value Date    LDLCALC 105.8 04/15/2025    LDLCALC 113.0 10/21/2024    LDLCALC 107.0 07/13/2022     Lab Results   Component Value Date    TRIG 76 04/15/2025    TRIG 80 10/21/2024    TRIG 70 07/13/2022     Lab Results   Component Value Date    CHOLHDL 23.4 04/15/2025    CHOLHDL 25.9 10/21/2024    CHOLHDL 27.5 07/13/2022     ..  Lab Results   Component Value Date    WBC 5.3 03/04/2020    HGB 14.4 03/04/2020    HCT 44.5 03/04/2020    MCV 87.8 03/04/2020     03/04/2020       Test(s) Reviewed  I have reviewed the following in detail:  [] Stress test   [] Angiography   [] Echocardiogram   [x] Labs   [x] Other:       Assessment:         ICD-10-CM ICD-9-CM   1. FRANCO (dyspnea on exertion)  R06.09 786.09   2. Nonspecific abnormal electrocardiogram (ECG) (EKG)  R94.31 794.31   3. Systolic murmur  R01.1 785.2   4. Atherosclerosis of arteries  I70.90 440.9   5. Hypercholesterolemia  E78.00 272.0   6. Fatty liver  K76.0 571.8   7. Obesity, Class I, BMI 30-34.9  E66.811 278.00     Problem List Items Addressed This Visit          Cardiac/Vascular    Hypercholesterolemia    Relevant  Orders    EKG 12-lead (Completed)    Hepatic Function Panel    Atherosclerosis of arteries    Nonspecific abnormal electrocardiogram (ECG) (EKG)    Relevant Orders    Stress Echo Which stress agent will be used? Treadmill Exercise; Color Flow Doppler? Yes    FRANCO (dyspnea on exertion) - Primary    Relevant Orders    EKG 12-lead (Completed)    Stress Echo Which stress agent will be used? Treadmill Exercise; Color Flow Doppler? Yes    Systolic murmur    Relevant Orders    Stress Echo Which stress agent will be used? Treadmill Exercise; Color Flow Doppler? Yes       Endocrine    Obesity, Class I, BMI 30-34.9       GI    Fatty liver    Relevant Orders    Hepatic Function Panel        Plan:     EKG SINUS RHYTHM INFERIOR Q-WAVE WILL NEED FURTHER EVALUATION CHECK STRESS ECHO, NEEDS DIET CONTINUES SPECIALLY IN VIEW OF FATTY LIVER,    ALL OTHER CV CLINICALLY STABLE, ASSESS EQUIVALENT ANGINA, NO HF, NO TIA, NO CLINICAL ARRHYTHMIA,CONTINUE CURRENT MEDS, EDUCATION, DIET, EXERCISE , WEIGHT LOSS RETURN TO CLINIC IN 3 MONTHS WITH LABS        FRANCO (dyspnea on exertion)  -     EKG 12-lead  -     Stress Echo Which stress agent will be used? Treadmill Exercise; Color Flow Doppler? Yes; Future    Nonspecific abnormal electrocardiogram (ECG) (EKG)  -     Stress Echo Which stress agent will be used? Treadmill Exercise; Color Flow Doppler? Yes; Future    Systolic murmur  -     Stress Echo Which stress agent will be used? Treadmill Exercise; Color Flow Doppler? Yes; Future    Atherosclerosis of arteries    Hypercholesterolemia  -     EKG 12-lead  -     Hepatic Function Panel; Future; Expected date: 08/16/2025    Fatty liver  -     Hepatic Function Panel; Future; Expected date: 08/16/2025    Obesity, Class I, BMI 30-34.9    RTC Low level/low impact aerobic exercise 5x's/wk. Heart healthy diet and risk factor modification.    See labs and med orders.    Aerobic exercise 5x's/wk. Heart healthy diet and risk factor modification.    See labs and  med orders.             [1]   Current Outpatient Medications:     aspirin-acetaminophen-caffeine 250-250-65 mg (EXCEDRIN MIGRAINE) 250-250-65 mg per tablet, Take 2 tablets by mouth once daily., Disp: , Rfl:     loratadine (CLARITIN) 10 mg tablet, Take 1 tablet (10 mg total) by mouth daily as needed for Allergies., Disp: , Rfl:     mometasone (NASONEX) 50 mcg/actuation nasal spray, 2 sprays by Nasal route once daily., Disp: , Rfl:     pravastatin (PRAVACHOL) 80 MG tablet, Take 0.5 tablets (40 mg total) by mouth every evening., Disp: , Rfl:

## 2025-05-20 ENCOUNTER — HOSPITAL ENCOUNTER (OUTPATIENT)
Dept: CARDIOLOGY | Facility: HOSPITAL | Age: 60
Discharge: HOME | End: 2025-05-20
Attending: INTERNAL MEDICINE
Payer: COMMERCIAL

## 2025-05-20 VITALS — BODY MASS INDEX: 29.82 KG/M2 | HEIGHT: 71 IN | WEIGHT: 213 LBS

## 2025-05-20 DIAGNOSIS — R06.09 DOE (DYSPNEA ON EXERTION): ICD-10-CM

## 2025-05-20 DIAGNOSIS — R01.1 SYSTOLIC MURMUR: ICD-10-CM

## 2025-05-20 DIAGNOSIS — R94.31 NONSPECIFIC ABNORMAL ELECTROCARDIOGRAM (ECG) (EKG): ICD-10-CM

## 2025-05-20 LAB
AORTIC SIZE INDEX (SOV): 2 CM/M2
AORTIC SIZE INDEX: 2 CM/M2
ASCENDING AORTA: 4.3 CM
AV INDEX (PROSTH): 0.72
AV MEAN GRADIENT: 5 MMHG
AV PEAK GRADIENT: 9 MMHG
AV REGURGITATION PRESSURE HALF TIME: 472 MS
AV VALVE AREA BY VELOCITY RATIO: 3.9 CM²
AV VALVE AREA: 3.6 CM²
AV VELOCITY RATIO: 0.8
BSA FOR ECHO PROCEDURE: 2.2 M2
CV ECHO LV RWT: 0.42 CM
CV STRESS BASE HR: 96 BPM
DIASTOLIC BLOOD PRESSURE: 88 MMHG
DOP CALC AO PEAK VEL: 1.5 M/S
DOP CALC AO VTI: 28.3 CM
DOP CALC LVOT AREA: 4.9 CM2
DOP CALC LVOT DIAMETER: 2.5 CM
DOP CALC LVOT PEAK VEL: 1.2 M/S
DOP CALC LVOT STROKE VOLUME: 100.6 CM3
DOP CALCLVOT PEAK VEL VTI: 20.5 CM
E WAVE DECELERATION TIME: 139 MSEC
E/A RATIO: 0.84
E/E' RATIO: 7 M/S
ECHO LV POSTERIOR WALL: 1.1 CM (ref 0.6–1.1)
FRACTIONAL SHORTENING: 37.7 % (ref 28–44)
INTERVENTRICULAR SEPTUM: 1.3 CM (ref 0.6–1.1)
LEFT ATRIUM AREA SYSTOLIC (APICAL 2 CHAMBER): 14.63 CM2
LEFT ATRIUM AREA SYSTOLIC (APICAL 4 CHAMBER): 14.98 CM2
LEFT ATRIUM SIZE: 4.1 CM
LEFT ATRIUM VOLUME INDEX MOD: 18 ML/M2
LEFT ATRIUM VOLUME MOD: 40 ML
LEFT INTERNAL DIMENSION IN SYSTOLE: 3.3 CM (ref 2.1–4)
LEFT VENTRICLE DIASTOLIC VOLUME INDEX: 61.75 ML/M2
LEFT VENTRICLE DIASTOLIC VOLUME: 134 ML
LEFT VENTRICLE END SYSTOLIC VOLUME APICAL 2 CHAMBER: 39.33 ML
LEFT VENTRICLE END SYSTOLIC VOLUME APICAL 4 CHAMBER: 39.74 ML
LEFT VENTRICLE MASS INDEX: 118.2 G/M2
LEFT VENTRICLE SYSTOLIC VOLUME INDEX: 20.3 ML/M2
LEFT VENTRICLE SYSTOLIC VOLUME: 44 ML
LEFT VENTRICULAR INTERNAL DIMENSION IN DIASTOLE: 5.3 CM (ref 3.5–6)
LEFT VENTRICULAR MASS: 256.6 G
LV LATERAL E/E' RATIO: 5.2 M/S
LV SEPTAL E/E' RATIO: 9.5 M/S
LVED V (TEICH): 133.8 ML
LVES V (TEICH): 44.05 ML
LVOT MG: 3.06 MMHG
LVOT MV: 0.83 CM/S
MV PEAK A VEL: 0.68 M/S
MV PEAK E VEL: 0.57 M/S
MV STENOSIS PRESSURE HALF TIME: 40.17 MS
MV VALVE AREA P 1/2 METHOD: 5.48 CM2
OHS CV CPX 1 MINUTE RECOVERY HEART RATE: 122 BPM
OHS CV CPX 85 PERCENT MAX PREDICTED HEART RATE MALE: 137
OHS CV CPX ESTIMATED METS: 15
OHS CV CPX MAX PREDICTED HEART RATE: 161
OHS CV CPX PATIENT IS FEMALE: 0
OHS CV CPX PATIENT IS MALE: 1
OHS CV CPX PEAK DIASTOLIC BLOOD PRESSURE: 85 MMHG
OHS CV CPX PEAK HEAR RATE: 169 BPM
OHS CV CPX PEAK RATE PRESSURE PRODUCT: ABNORMAL
OHS CV CPX PEAK SYSTOLIC BLOOD PRESSURE: 214 MMHG
OHS CV CPX PERCENT MAX PREDICTED HEART RATE ACHIEVED: 105
OHS CV CPX RATE PRESSURE PRODUCT PRESENTING: ABNORMAL
OHS CV RV/LV RATIO: 0.66 CM
PISA AR MAX VEL: 3.77 M/S
PISA TR MAX VEL: 2.5 M/S
RA PRESSURE ESTIMATED: 3 MMHG
RA VOL SYS: 32.2 ML
RIGHT ATRIAL AREA: 13.8 CM2
RIGHT ATRIUM END SYSTOLIC VOLUME APICAL 4 CHAMBER INDEX BSA: 14.15 ML/M2
RIGHT ATRIUM VOLUME AREA LENGTH APICAL 4 CHAMBER: 30.71 ML
RIGHT VENTRICLE DIASTOLIC BASEL DIMENSION: 3.5 CM
RIGHT VENTRICLE DIASTOLIC LENGTH: 7.1 CM
RIGHT VENTRICLE DIASTOLIC MID DIMENSION: 2.2 CM
RIGHT VENTRICULAR END-DIASTOLIC DIMENSION: 3.48 CM
RIGHT VENTRICULAR LENGTH IN DIASTOLE (APICAL 4-CHAMBER VIEW): 7.08 CM
RV MID DIAMA: 2.19 CM
RV TB RVSP: 6 MMHG
RV TISSUE DOPPLER FREE WALL SYSTOLIC VELOCITY 1 (APICAL 4 CHAMBER VIEW): 15.93 CM/S
SINUS: 4.25 CM
STJ: 4 CM
STRESS ECHO POST EXERCISE DUR MIN: 8 MINUTES
STRESS ECHO POST EXERCISE DUR SEC: 34 SECONDS
SYSTOLIC BLOOD PRESSURE: 141 MMHG
TDI LATERAL: 0.11 M/S
TDI SEPTAL: 0.06 M/S
TDI: 0.09 M/S
TR MAX PG: 26 MMHG
TRICUSPID ANNULAR PLANE SYSTOLIC EXCURSION: 2.7 CM
TV REST PULMONARY ARTERY PRESSURE: 28 MMHG
Z-SCORE OF LEFT VENTRICULAR DIMENSION IN END DIASTOLE: -3.04
Z-SCORE OF LEFT VENTRICULAR DIMENSION IN END SYSTOLE: -2.22

## 2025-05-20 PROCEDURE — 93351 STRESS TTE COMPLETE: CPT | Mod: 26,,, | Performed by: INTERNAL MEDICINE

## 2025-05-20 PROCEDURE — 93320 DOPPLER ECHO COMPLETE: CPT | Mod: 26,,, | Performed by: INTERNAL MEDICINE

## 2025-05-20 PROCEDURE — 93320 DOPPLER ECHO COMPLETE: CPT | Mod: PO

## 2025-05-20 PROCEDURE — 93325 DOPPLER ECHO COLOR FLOW MAPG: CPT | Mod: 26,,, | Performed by: INTERNAL MEDICINE

## 2025-06-02 PROBLEM — G47.33 OSA (OBSTRUCTIVE SLEEP APNEA): Status: ACTIVE | Noted: 2025-06-02

## 2025-08-08 ENCOUNTER — LAB VISIT (OUTPATIENT)
Dept: LAB | Facility: HOSPITAL | Age: 60
End: 2025-08-08
Attending: INTERNAL MEDICINE
Payer: COMMERCIAL

## 2025-08-08 DIAGNOSIS — K76.0 FATTY LIVER: Chronic | ICD-10-CM

## 2025-08-08 DIAGNOSIS — E78.00 HYPERCHOLESTEROLEMIA: Chronic | ICD-10-CM

## 2025-08-08 LAB
ALBUMIN SERPL BCP-MCNC: 4.2 G/DL (ref 3.5–5.2)
ALP SERPL-CCNC: 134 UNIT/L (ref 40–150)
ALT SERPL W/O P-5'-P-CCNC: 38 UNIT/L (ref 0–55)
AST SERPL-CCNC: 33 UNIT/L (ref 0–50)
BILIRUB DIRECT SERPL-MCNC: 0.2 MG/DL (ref 0.1–0.3)
BILIRUB SERPL-MCNC: 0.5 MG/DL (ref 0.1–1)
PROT SERPL-MCNC: 8.3 GM/DL (ref 6–8.4)

## 2025-08-08 PROCEDURE — 80076 HEPATIC FUNCTION PANEL: CPT

## 2025-08-08 PROCEDURE — 36415 COLL VENOUS BLD VENIPUNCTURE: CPT | Mod: PO

## 2025-08-15 ENCOUNTER — OFFICE VISIT (OUTPATIENT)
Dept: CARDIOLOGY | Facility: CLINIC | Age: 60
End: 2025-08-15
Payer: COMMERCIAL

## 2025-08-15 VITALS
WEIGHT: 216.06 LBS | HEART RATE: 76 BPM | DIASTOLIC BLOOD PRESSURE: 78 MMHG | HEIGHT: 71 IN | SYSTOLIC BLOOD PRESSURE: 118 MMHG | BODY MASS INDEX: 30.25 KG/M2

## 2025-08-15 DIAGNOSIS — E66.811 OBESITY, CLASS I, BMI 30-34.9: Chronic | ICD-10-CM

## 2025-08-15 DIAGNOSIS — I35.1 NONRHEUMATIC AORTIC VALVE INSUFFICIENCY: ICD-10-CM

## 2025-08-15 DIAGNOSIS — I77.810 ASCENDING AORTA DILATATION: Primary | ICD-10-CM

## 2025-08-15 DIAGNOSIS — E78.00 HYPERCHOLESTEROLEMIA: Chronic | ICD-10-CM

## 2025-08-15 DIAGNOSIS — I70.90 ATHEROSCLEROSIS OF ARTERIES: Chronic | ICD-10-CM

## 2025-08-15 PROCEDURE — 99999 PR PBB SHADOW E&M-EST. PATIENT-LVL III: CPT | Mod: PBBFAC,,, | Performed by: INTERNAL MEDICINE

## 2025-08-15 PROCEDURE — 99214 OFFICE O/P EST MOD 30 MIN: CPT | Mod: S$GLB,,, | Performed by: INTERNAL MEDICINE

## 2025-08-15 PROCEDURE — 3074F SYST BP LT 130 MM HG: CPT | Mod: CPTII,S$GLB,, | Performed by: INTERNAL MEDICINE

## 2025-08-15 PROCEDURE — 3008F BODY MASS INDEX DOCD: CPT | Mod: CPTII,S$GLB,, | Performed by: INTERNAL MEDICINE

## 2025-08-15 PROCEDURE — 3078F DIAST BP <80 MM HG: CPT | Mod: CPTII,S$GLB,, | Performed by: INTERNAL MEDICINE

## 2025-08-15 PROCEDURE — 1159F MED LIST DOCD IN RCRD: CPT | Mod: CPTII,S$GLB,, | Performed by: INTERNAL MEDICINE

## 2025-08-15 RX ORDER — METOPROLOL SUCCINATE 25 MG/1
12.5 TABLET, EXTENDED RELEASE ORAL DAILY
Qty: 45 TABLET | Refills: 1 | Status: SHIPPED | OUTPATIENT
Start: 2025-08-15 | End: 2026-08-15

## 2025-08-15 RX ORDER — ROSUVASTATIN CALCIUM 10 MG/1
5 TABLET, COATED ORAL NIGHTLY
Qty: 45 TABLET | Refills: 0 | Status: SHIPPED | OUTPATIENT
Start: 2025-08-15 | End: 2026-08-15

## 2025-08-25 ENCOUNTER — HOSPITAL ENCOUNTER (OUTPATIENT)
Dept: CARDIOLOGY | Facility: HOSPITAL | Age: 60
Discharge: HOME | End: 2025-08-25
Attending: INTERNAL MEDICINE
Payer: COMMERCIAL

## 2025-08-25 DIAGNOSIS — I77.810 ASCENDING AORTA DILATATION: ICD-10-CM

## 2025-08-25 DIAGNOSIS — E78.00 HYPERCHOLESTEROLEMIA: Chronic | ICD-10-CM

## 2025-08-25 DIAGNOSIS — I70.90 ATHEROSCLEROSIS OF ARTERIES: Chronic | ICD-10-CM

## 2025-08-25 LAB
ABDOMINAL IMA AP: 1.9 CM
ABDOMINAL IMA PS VEL: 98 CM/S
ABDOMINAL IMA TRANS: 1.9 CM
ABDOMINAL INFRARENAL AORTA AP: 1.9 CM
ABDOMINAL INFRARENAL AORTA PS VEL: 86 CM/S
ABDOMINAL INFRARENAL AORTA TRANS: 1.9 CM
ABDOMINAL JUXTARENAL AORTA AP: 1.9 CM
ABDOMINAL JUXTARENAL AORTA ED VEL: 17 CM/S
ABDOMINAL JUXTARENAL AORTA PS VEL: 94 CM/S
ABDOMINAL JUXTARENAL AORTA TRANS: 1.9 CM
ABDOMINAL LT COM ILIAC AP: 1.4 CM
ABDOMINAL LT COM ILIAC TRANS: 1.4 CM
ABDOMINAL LT COM ILIAC VEL: 112 CM/S
ABDOMINAL RT COM ILIAC AP: 1.3 CM
ABDOMINAL RT COM ILIAC TRANS: 1.3 CM
ABDOMINAL RT COM ILIAC VEL: 101 CM/S
ABDOMINAL SUPRARENAL AORTA AP: 2.4 CM
ABDOMINAL SUPRARENAL AORTA ED VEL: 15 CM/S
ABDOMINAL SUPRARENAL AORTA PS VEL: 80 CM/S
ABDOMINAL SUPRARENAL AORTA TRANS: 2.4 CM

## 2025-08-25 PROCEDURE — 93978 VASCULAR STUDY: CPT | Mod: PO

## 2025-08-25 PROCEDURE — 93978 VASCULAR STUDY: CPT | Mod: 26,,, | Performed by: INTERNAL MEDICINE
